# Patient Record
(demographics unavailable — no encounter records)

---

## 2024-10-22 NOTE — REASON FOR VISIT
[Patient with collateral] : Patient with collateral  [Father] : father [FreeTextEntry1] : med management

## 2024-10-22 NOTE — RISK ASSESSMENT
[No, patient denies ideation or behavior] : No, patient denies ideation or behavior [Clinical Interview] : Clinical Interview [Collateral Sources] : Collateral Sources [No] : No [None in the patient's lifetime] : None in the patient's lifetime [None Known] : none known

## 2024-10-22 NOTE — PHYSICAL EXAM
[Cooperative] : cooperative [Clear] : clear [[place]] : concrete [Hallucinations - Auditory] : hallucinations - auditory [Moderate] : moderate [Well groomed] : well groomed [Average] : average [Euthymic] : euthymic [None] : none [None Reported] : none reported [WNL] : within normal limits [de-identified] : Improved, more supple [de-identified] : paranoid delusions

## 2024-10-22 NOTE — HISTORY OF PRESENT ILLNESS
[FreeTextEntry1] : Chart reviewed, patient seen in person with pt's father.   At follow up today, patient appears far less oddly related and internally preoccupied than prior to his hospitalization with ECT treatments. He denies any lingering paranoia and states that the voices he hears have decreased. He states his mood is good, and that he feels good on his current medication regimen (has been receiving only Haldol 15 mg daily and benztropine; lithium, invega and klonopin were discontinued during hospitalization). Mentions that he would like to be on an HORN at some point instead of taking PO meds. States he is sleeping and eating well.   Father states that Michael is doing much better, he denies any acute safety concerns at this time.  [FreeTextEntry2] : Hospitalization at [facility] 8/3/20 to 9/16/20. ER records describe patient as "disorganized with constricted affect, behavior changes at home, increased aggressivity and internally preoccupied." He was stabilized on Risperdal and Klonopin. Pt then stopped taking meds, and was admitted 2/17/21-3/25/21 for decompensation. Pt was started on Zyprexa, with plan to transition to HORN, and dose was titrated up to 20mg. Dario developed elevated LFTs and neutropenia, therefore was cross titrated to Invega and lithium + klonopin were also started for agitation and mood. Pt showed minimal improvement. ECT was then considered, and Lithium and Klonopin were tapered off. Pt had 8 Bilateral ECT treatments with significant improvement in psychotic and mood symptoms. He received Invega 234mg on 5/19/21, 156mg on 5/24/21. He had no significant side effects from the injection. His next dose of 156mg is today and then one month from today

## 2024-10-22 NOTE — PHYSICAL EXAM
[Cooperative] : cooperative [Clear] : clear [[place]] : concrete [Hallucinations - Auditory] : hallucinations - auditory [Moderate] : moderate [Well groomed] : well groomed [Average] : average [Euthymic] : euthymic [None] : none [None Reported] : none reported [WNL] : within normal limits [de-identified] : Improved, more supple [de-identified] : paranoid delusions

## 2024-10-22 NOTE — PLAN
[Medication education provided] : Medication education provided. [Rationale for medication choices, possible risks/precautions, benefits, alternative treatment choices, and consequences of non-treatment discussed] : Rationale for medication choices, possible risks/precautions, benefits, alternative treatment choices, and consequences of non-treatment discussed with patient/family/caregiver  [FreeTextEntry4] : Goal 2: Michael will create meaningful personal, social, and academic engagements. Goal keyword or goal statement: Michael to work toward gaining employment.  Assessment of progress on goal/objective: Michael has enrolled in college and is a full time student. At present he doesn't want to work and would like to focus on his academics.

## 2024-10-22 NOTE — SOCIAL HISTORY
[FreeTextEntry1] : Living Situation: lives with family and Parents and 2 siblings.  Source of Income: unemployed.  Relationship Status: never .  no known substance use

## 2024-10-22 NOTE — HISTORY OF PRESENT ILLNESS
[FreeTextEntry1] : Chart reviewed, patient seen in person with pt's father.   At follow up today, patient appears far less oddly related and internally preoccupied than prior to his hospitalization with ECT treatments. He denies any lingering paranoia and states that the voices he hears have decreased. He states his mood is good, and that he feels good on his current medication regimen (has been receiving only Haldol 15 mg daily and benztropine; lithium, invega and klonopin were discontinued during hospitalization). Mentions that he would like to be on an HORN at some point instead of taking PO meds. States he is sleeping and eating well.   Father states that Michael is doing much better, he denies any acute safety concerns at this time.  [FreeTextEntry2] : Hospitalization at [facility] 8/3/20 to 9/16/20. ER records describe patient as "disorganized with constricted affect, behavior changes at home, increased aggressivity and internally preoccupied." He was stabilized on Risperdal and Klonopin. Pt then stopped taking meds, and was admitted 2/17/21-3/25/21 for decompensation. Pt was started on Zyprexa, with plan to transition to HORN, and dose was titrated up to 20mg. Dario developed elevated LFTs and neutropenia, therefore was cross titrated to Invega and lithium + klonopin were also started for agitation and mood. Pt showed minimal improvement. ECT was then considered, and Lithium and Klonopin were tapered off. Pt had 8 Bilateral ECT treatments with significant improvement in psychotic and mood symptoms. He received Invega 234mg on 5/19/21, 156mg on 5/24/21. He had no significant side effects from the injection. His next dose of 156mg is today and then one month from today

## 2024-10-23 NOTE — PLAN
[FreeTextEntry2] : Goal 1. The patient will recognize and improve daily symptoms of his schizoaffective disorder, bipolar type diagnosis as evidenced by an improvement in scores on mental health scales reviewed in therapy sessions every 1 year and get back to college to complete his degree.   Obj 1- The patient will attend bi weekly therapy sessions and will review symptoms and coping skills during sessions. The patient will practice 1 meaningful activity a day   Obj 2- The patient will take daily meds as prescribed and meet with psychiatrist as scheduled  [Cognitive and/or Behavior Therapy] : Cognitive and/or Behavior Therapy  [Interpersonal Therapy] : Interpersonal Therapy  [Motivational Interviewing] : Motivational Interviewing  [Psychodynamic Therapy] : Psychodynamic Therapy  [Psychoeducation] : Psychoeducation  [Skills training (all types)] : Skills training (all types)  [Supportive Therapy] : Supportive Therapy [de-identified] : The patient came for his session in person with his father. The patient was just recently discharged from the hospital where he went voluntarily, spent about a month and had ECT. During the session, the patient was logical, able to answer questions, was more focused, not sleepy, didn't giggle, and acted appropriately. The patient's father also stated that he sees a lot of positive changes after ECT, something that also worked for the patient last time he had it done. The patient goes to the gym with his brother and sister, spends time at home, plays computer games and is planning to go to college. The therapist and the patient discussed an option for the patient not to start college right away since he was just discharged, the patient and his father agreed. Overall the patient's state has improved.  The patient takes medications as prescribed. Throughout the session the patient was provided with active listening, motivational interviewing, emotional support and empathy.  [Recommended Frequency of Visits: ____] : Recommended frequency of visits: [unfilled] [Return in ____ week(s)] : Return in [unfilled] week(s) [FreeTextEntry1] : the therapist will meet the patient every 2-3 weeks

## 2024-10-23 NOTE — REASON FOR VISIT
[Other Location: e.g. Home (Enter Location, City,State)___] : The patient, [unfilled], was located at [unfilled] at the time of the visit. [FreeTextEntry4] : 10 am [FreeTextEntry5] : 10 45am [Patient] : Patient [Father] : father [FreeTextEntry1] : therapy f/u

## 2024-10-29 NOTE — DISCUSSION/SUMMARY
[Date of Last Annual Labs: _____] : Date of Last Annual Labs: [unfilled] [Tobacco Screening Completed?] : Tobacco Screening Completed: Yes [Date of Last HbgA1c: _____] : Date of Last HbgA1c: [unfilled] [Date of Last Lipid Profile: _____] : Date of Last Lipid Profile: [unfilled] [FreeTextEntry1] : Contacted physician via Teams on inpatient at [address] [place] [address] where patient had received ECT treatment in order to ask about plan for maintenance ECT now that he has been discharged

## 2024-10-29 NOTE — DISCUSSION/SUMMARY
[FreeTextEntry1] : Pt. no - showed to visit. Provider called, patient stated he forgot. Provider called father, set up appointment for November 5th at 9am

## 2024-10-29 NOTE — DISCUSSION/SUMMARY
[Date of Last Annual Labs: _____] : Date of Last Annual Labs: [unfilled] [Tobacco Screening Completed?] : Tobacco Screening Completed: Yes [Date of Last HbgA1c: _____] : Date of Last HbgA1c: [unfilled] [Date of Last Lipid Profile: _____] : Date of Last Lipid Profile: [unfilled] [FreeTextEntry1] : Contacted physician via Teams on inpatient at [address] [place] [address] where patient had received ECT treatment in order to ask about plan for maintenance ECT now that he has been discharged

## 2024-11-05 NOTE — HISTORY OF PRESENT ILLNESS
[FreeTextEntry1] : Chart reviewed, patient seen in person with pt's father and his sister.   At follow up today, patient appears linear, not paranoid, not internally preoccupied.  He denies any lingering paranoia and states that he no longer has any auditory hallucinations.  He states his mood is good, and that he feels good on his current medication regimen. Provider discussed with patient and his family benefit of maintenance ECT therapy, at this Michael is hesitant to start (states he is scared of the anesthesia and the trek), but father and sister are open to the idea. Family would like provider to speak with mother before restarting lithium. Planning to get HORN next week, in the meantime has been taking his PO meds without AE. States he is sleeping and eating well.   Father states that Michael is doing much better, he denies any acute safety concerns at this time.  [FreeTextEntry2] : Hospitalization at [facility] 8/3/20 to 9/16/20. ER records describe patient as "disorganized with constricted affect, behavior changes at home, increased aggressivity and internally preoccupied." He was stabilized on Risperdal and Klonopin. Pt then stopped taking meds, and was admitted 2/17/21-3/25/21 for decompensation. Pt was started on Zyprexa, with plan to transition to HORN, and dose was titrated up to 20mg. Dario developed elevated LFTs and neutropenia, therefore was cross titrated to Invega and lithium + klonopin were also started for agitation and mood. Pt showed minimal improvement. ECT was then considered, and Lithium and Klonopin were tapered off. Pt had 8 Bilateral ECT treatments with significant improvement in psychotic and mood symptoms. He received Invega 234mg on 5/19/21, 156mg on 5/24/21. He had no significant side effects from the injection. His next dose of 156mg is today and then one month from today

## 2024-11-05 NOTE — HISTORY OF PRESENT ILLNESS
[FreeTextEntry1] : Chart reviewed, patient seen in person with pt's father and his sister.   At follow up today, patient appears linear, not paranoid, not internally preoccupied.  He denies any lingering paranoia and states that he no longer has any auditory hallucinations.  He states his mood is good, and that he feels good on his current medication regimen. Provider discussed with patient and his family benefit of maintenance ECT therapy, at this Michael is hesitant to start (states he is scared of the anesthesia and the trek), but father and sister are open to the idea. Family would like provider to speak with mother before restarting lithium. Planning to get HORN next week, in the meantime has been taking his PO meds without AE. States he is sleeping and eating well.   Father states that Michael is doing much better, he denies any acute safety concerns at this time.  [FreeTextEntry2] : Hospitalization at [facility] 8/3/20 to 9/16/20. ER records describe patient as "disorganized with constricted affect, behavior changes at home, increased aggressivity and internally preoccupied." He was stabilized on Risperdal and Klonopin. Pt then stopped taking meds, and was admitted 2/17/21-3/25/21 for decompensation. Pt was started on Zyprexa, with plan to transition to HORN, and dose was titrated up to 20mg. Dario developed elevated LFTs and neutropenia, therefore was cross titrated to Invega and lithium + klonopin were also started for agitation and mood. Pt showed minimal improvement. ECT was then considered, and Lithium and Klonopin were tapered off. Pt had 8 Bilateral ECT treatments with significant improvement in psychotic and mood symptoms. He received Invega 234mg on 5/19/21, 156mg on 5/24/21. He had no significant side effects from the injection. His next dose of 156mg is today and then one month from today

## 2024-11-05 NOTE — PHYSICAL EXAM
[Well groomed] : well groomed [Cooperative] : cooperative [Euthymic] : euthymic [Clear] : clear [[place]] : concrete [None] : none [None Reported] : none reported [Hallucinations - Auditory] : hallucinations - auditory [Average] : average [WNL] : within normal limits [Moderate] : moderate [de-identified] : Improved, more supple [de-identified] : paranoid delusions [0] : 0 [No] : No

## 2024-11-05 NOTE — PHYSICAL EXAM
[Well groomed] : well groomed [Cooperative] : cooperative [Euthymic] : euthymic [Clear] : clear [[place]] : concrete [None] : none [None Reported] : none reported [Hallucinations - Auditory] : hallucinations - auditory [Average] : average [WNL] : within normal limits [Moderate] : moderate [de-identified] : Improved, more supple [de-identified] : paranoid delusions [0] : 0 [No] : No

## 2024-11-06 NOTE — PLAN
[FreeTextEntry2] : Goal 1. The patient will recognize and improve daily symptoms of his schizoaffective disorder, bipolar type diagnosis as evidenced by an improvement in scores on mental health scales reviewed in therapy sessions every 1 year and get back to college to complete his degree.   Obj 1- The patient will attend bi weekly therapy sessions and will review symptoms and coping skills during sessions. The patient will practice 1 meaningful activity a day   Obj 2- The patient will take daily meds as prescribed and meet with psychiatrist as scheduled  [Cognitive and/or Behavior Therapy] : Cognitive and/or Behavior Therapy  [Interpersonal Therapy] : Interpersonal Therapy  [Motivational Interviewing] : Motivational Interviewing  [Psychodynamic Therapy] : Psychodynamic Therapy  [Psychoeducation] : Psychoeducation  [Skills training (all types)] : Skills training (all types)  [Supportive Therapy] : Supportive Therapy [de-identified] : The patient came for his therapy session with his father. The patient and his father reported that the patient does great after the ECT and procedure. Also, previously the patient refused to have ECT maintenance and now he is open to see what his options for the maintenance are. In addition, the patient was able to get a job as a  in [place] at the [employer]. The patient was thinking of taking lots of shifts but was encouraged by the therapist to take it easy and give himself enough time to rest. The patient reports good mood, no issues or concerns. Overall, the patient is doing much better and was praised for always being on time and finding a job. The patient's father sees a big improvement after the ECT. The patient takes medications as prescribed. Throughout the session the patient was provided with active listening, motivational interviewing, emotional support and empathy.  [Recommended Frequency of Visits: ____] : Recommended frequency of visits: [unfilled] [Return in ____ week(s)] : Return in [unfilled] week(s) [FreeTextEntry1] : the therapist will meet the patient every 2-3 weeks

## 2024-11-06 NOTE — PLAN
[FreeTextEntry2] : Goal 1. The patient will recognize and improve daily symptoms of his schizoaffective disorder, bipolar type diagnosis as evidenced by an improvement in scores on mental health scales reviewed in therapy sessions every 1 year and get back to college to complete his degree.   Obj 1- The patient will attend bi weekly therapy sessions and will review symptoms and coping skills during sessions. The patient will practice 1 meaningful activity a day   Obj 2- The patient will take daily meds as prescribed and meet with psychiatrist as scheduled  [Cognitive and/or Behavior Therapy] : Cognitive and/or Behavior Therapy  [Interpersonal Therapy] : Interpersonal Therapy  [Motivational Interviewing] : Motivational Interviewing  [Psychodynamic Therapy] : Psychodynamic Therapy  [Psychoeducation] : Psychoeducation  [Skills training (all types)] : Skills training (all types)  [Supportive Therapy] : Supportive Therapy [de-identified] : The patient came for his therapy session with his father. The patient and his father reported that the patient does great after the ECT and procedure. Also, previously the patient refused to have ECT maintenance and now he is open to see what his options for the maintenance are. In addition, the patient was able to get a job as a  in [place] at the [employer]. The patient was thinking of taking lots of shifts but was encouraged by the therapist to take it easy and give himself enough time to rest. The patient reports good mood, no issues or concerns. Overall, the patient is doing much better and was praised for always being on time and finding a job. The patient's father sees a big improvement after the ECT. The patient takes medications as prescribed. Throughout the session the patient was provided with active listening, motivational interviewing, emotional support and empathy.  [Recommended Frequency of Visits: ____] : Recommended frequency of visits: [unfilled] [Return in ____ week(s)] : Return in [unfilled] week(s) [FreeTextEntry1] : the therapist will meet the patient every 2-3 weeks

## 2024-11-08 NOTE — DISCUSSION/SUMMARY
[Date of Last Annual Labs: _____] : Date of Last Annual Labs: [unfilled] [Tobacco Screening Completed?] : Tobacco Screening Completed: Yes [Date of Last HbgA1c: _____] : Date of Last HbgA1c: [unfilled] [Date of Last Lipid Profile: _____] : Date of Last Lipid Profile: [unfilled] [FreeTextEntry1] : Provider called pt's mother and left VM, as father has mentioned that mother wanted to talk about pt's mental health. Writer requested call back if mother wanted to discuss further.

## 2024-11-13 NOTE — TREATMENT
[No Side Effects] : No side effects [FreeTextEntry4] : Michael was seen today for Haldol Decanoate 150 mg IM in the right deltoid muscle. Area cleansed prior to and without signs or symptoms of adverse reaction after administration. Medication education reviewed with Michael to stop his oral haldol today he verbalized understanding as did Cristiano his Dad. Michael was cooperative and friendly. Speaking about his job and enjoying his "paycheck". Micheal reports his mood is good. His mood euthymic and bright affect eye contact and adl's good. Thoughts relevant to conversation without dangerous ideations. He will return to this clinic on 12/10 to meet with Dr. Flores and this writer for injection.

## 2024-12-10 NOTE — TREATMENT
[No Side Effects] : No side effects [FreeTextEntry4] : Michael was seen today for Haldol Decanoate 150mg IM in the left deltoid muscle. Area cleansed prior to and without signs or symptoms of adverse reaction after administration. Medication education reviewed with Michael he denies any side effects or concerns at this time. Michael was pleasant and cooperative with appointment he also had an appointment with Dr Flores please refer to that note for a mental status. He will return to this clinic on 1/7/2025 to meet with Dr Flores and this writer for injection.

## 2024-12-10 NOTE — DISCUSSION/SUMMARY
[FreeTextEntry1] : Michael is a 20 yo male, never , no children, domiciled with family (mother, father, 27yo brother, 22yo sister), in private residence, 3rd year of [employer] studying Accounting with a focus on  (also taking online courses at [employer]), previously self-employed part-time design, 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 PMH, PPH of schizoaffective bipolar type on invega sustenna HORN, multiple IPP admissions (first IPP admission 8/3/20-9/16/20 or new onset psychosis, second IPP in 2/17/21-3/25/21 for non-compliance with medications, third IPP at [facility] 03/28/24-04/04/24 for worsening psychosis and aggression , 4th IPP 4/3/24-4/18/24 for worsening psychosis/diana at [place], [place]), hx of medication trials on lithium and klonopin, hx of 8 bilateral ECT treatments with significant improvement in psychotic and mood symptoms, no pSA/NSSIB, no SI/HI, no substance use, no reported legal hx, who was previously a pt of Dr. Garcia through FEP (first episode psychosis) program, now a patient at [facility]  Per chart review from FEP, "Dario presented initially with changes in behavior since July 2020 leading to disorganized thought process/behavior and internal preoccupation with physically aggressive behavior resulting in first hospitalization at [facility] 8/3/20-9/16/20 consistent with diagnosis of Schizophrenia. He was on Risperdal and Klonopin, but stopped taking meds around late 2021 and early 2021 which led to his second hospitalization 2/17/21-3/25/21 for disorganized behavior, AH, and paranoia in the context of medication non-adherence. As per discharge summary, patient during hospitalization had several episodes of agitation, aggression due to his psychosis. Pt was started on Zyprexa, with plan to transition to HORN, and dose was titrated up to 20mg. Dario developed elevated LFTs and neutropenia, therefore was cross titrated to Invega and lithium + klonopin were also started for agitation and mood. Pt showed minimal improvement. ECT was then considered, and Lithium and Klonopin were tapered off. Pt had 8 Bilateral ECT treatments with significant improvement in psychotic and mood symptoms. He had no significant side effects from the injection. Michael was again hospitalized in September at [facility] for ECT, lithium and Klonopin were tapered off, and he received 8 ECT treatments.    At follow up today, patient continues to appear far less oddly related and internally preoccupied than prior to his hospitalization with ECT treatments. He denies any lingering paranoia and states that the voices he hears have decreased. He states his mood is good, and that he feels good on his current medication regimen. Discussed maintenance ECT which patient and family are now amendable to. Patient has no acute decompensation of his psychiatric symptoms at this time, family has no acute safety concerns. Remains appropriate for outpatient level of care  Impression: schizoaffective disorder, bipolar type, with poor prognostic features  Plan: - Next HORN of Haldol scheduled for 01/07 - Pt now amendable to maintenance ECT, provider reached out to ECT physician at [facility] where patient had his last sessions - Consider lithium for mood symptoms, will discuss with mother over the phone - Pt sp Invega HORN 234 mg, lithium (1350) and Klonopin, may consider restarting if psychotic symptoms worsen - in future, may consider initiation of clozapine to replace the PO Haldol, however pt previously neutropenic on zyprexa and had elevated liver enzymes - Follow up with patient in 4 weeks  [Date of Last AIMS: _____] : Date of Last AIMS: [unfilled] [Date of Last HbgA1c: _____] : Date of Last HbgA1c: [unfilled] [Date of Last Lipid Profile: _____] : Date of Last Lipid Profile: [unfilled]

## 2024-12-10 NOTE — HISTORY OF PRESENT ILLNESS
[FreeTextEntry1] : Chart reviewed, patient seen in person with pt's father.   At follow up today, patient appears linear, not paranoid, not internally preoccupied. He has a supple affect. He denies any lingering paranoia and states that he no longer has any auditory hallucinations.  He states his mood is good, and that he feels good on his current medication regimen. He and his family are agreeable to starting maintenance ECT at this time at [facility]. States he is sleeping and eating well. Denies any AE of Haldol including rigidity, spasticity, akathisia or involuntary movements.  Father states that Michael is doing much better, he denies any acute safety concerns at this time.  [FreeTextEntry2] : Hospitalization at [facility] 8/3/20 to 9/16/20. ER records describe patient as "disorganized with constricted affect, behavior changes at home, increased aggressivity and internally preoccupied." He was stabilized on Risperdal and Klonopin. Pt then stopped taking meds, and was admitted 2/17/21-3/25/21 for decompensation. Pt was started on Zyprexa, with plan to transition to HORN, and dose was titrated up to 20mg. Dario developed elevated LFTs and neutropenia, therefore was cross titrated to Invega and lithium + klonopin were also started for agitation and mood. Pt showed minimal improvement. ECT was then considered, and Lithium and Klonopin were tapered off. Pt had 8 Bilateral ECT treatments with significant improvement in psychotic and mood symptoms. He received Invega 234mg on 5/19/21, 156mg on 5/24/21. He had no significant side effects from the injection. His next dose of 156mg is today and then one month from today negative

## 2024-12-10 NOTE — PLAN
[FreeTextEntry4] : Goal 2: Michael will create meaningful personal, social, and academic engagements. Goal keyword or goal statement: Michael to work toward gaining employment.  Assessment of progress on goal/objective: Michael has enrolled in college and is a full time student. At present he doesn't want to work and would like to focus on his academics.

## 2024-12-10 NOTE — HISTORY OF PRESENT ILLNESS
[FreeTextEntry1] : Chart reviewed, patient seen in person with pt's father.   At follow up today, patient appears linear, not paranoid, not internally preoccupied. He has a supple affect. He denies any lingering paranoia and states that he no longer has any auditory hallucinations.  He states his mood is good, and that he feels good on his current medication regimen. He and his family are agreeable to starting maintenance ECT at this time at [facility]. States he is sleeping and eating well. Denies any AE of Haldol including rigidity, spasticity, akathisia or involuntary movements.  Father states that Michael is doing much better, he denies any acute safety concerns at this time.  [FreeTextEntry2] : Hospitalization at [facility] 8/3/20 to 9/16/20. ER records describe patient as "disorganized with constricted affect, behavior changes at home, increased aggressivity and internally preoccupied." He was stabilized on Risperdal and Klonopin. Pt then stopped taking meds, and was admitted 2/17/21-3/25/21 for decompensation. Pt was started on Zyprexa, with plan to transition to HORN, and dose was titrated up to 20mg. Dario developed elevated LFTs and neutropenia, therefore was cross titrated to Invega and lithium + klonopin were also started for agitation and mood. Pt showed minimal improvement. ECT was then considered, and Lithium and Klonopin were tapered off. Pt had 8 Bilateral ECT treatments with significant improvement in psychotic and mood symptoms. He received Invega 234mg on 5/19/21, 156mg on 5/24/21. He had no significant side effects from the injection. His next dose of 156mg is today and then one month from today

## 2024-12-10 NOTE — DISCUSSION/SUMMARY
[FreeTextEntry1] : Michael is a 22 yo male, never , no children, domiciled with family (mother, father, 27yo brother, 22yo sister), in private residence, 3rd year of [employer] studying Accounting with a focus on  (also taking online courses at [employer] [employer]), previously self-employed part-time design, 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 PMH, PPH of schizoaffective bipolar type on invega sustenna HORN, multiple IPP admissions (first IPP admission 8/3/20-9/16/20 or new onset psychosis, second IPP in 2/17/21-3/25/21 for non-compliance with medications, third IPP at [facility] 03/28/24-04/04/24 for worsening psychosis and aggression , 4th IPP 4/3/24-4/18/24 for worsening psychosis/diana at [place], [place]), hx of medication trials on lithium and klonopin, hx of 8 bilateral ECT treatments with significant improvement in psychotic and mood symptoms, no pSA/NSSIB, no SI/HI, no substance use, no reported legal hx, who was previously a pt of Dr. Garcia through FEP (first episode psychosis) program, now a patient at [facility]  Per chart review from FEP, "Dario presented initially with changes in behavior since July 2020 leading to disorganized thought process/behavior and internal preoccupation with physically aggressive behavior resulting in first hospitalization at [facility] 8/3/20-9/16/20 consistent with diagnosis of Schizophrenia. He was on Risperdal and Klonopin, but stopped taking meds around late 2021 and early 2021 which led to his second hospitalization 2/17/21-3/25/21 for disorganized behavior, AH, and paranoia in the context of medication non-adherence. As per discharge summary, patient during hospitalization had several episodes of agitation, aggression due to his psychosis. Pt was started on Zyprexa, with plan to transition to HORN, and dose was titrated up to 20mg. Dario developed elevated LFTs and neutropenia, therefore was cross titrated to Invega and lithium + klonopin were also started for agitation and mood. Pt showed minimal improvement. ECT was then considered, and Lithium and Klonopin were tapered off. Pt had 8 Bilateral ECT treatments with significant improvement in psychotic and mood symptoms. He had no significant side effects from the injection. Michael was again hospitalized in September at [facility] for ECT, lithium and Klonopin were tapered off, and he received 8 ECT treatments.    At follow up today, patient continues to appear far less oddly related and internally preoccupied than prior to his hospitalization with ECT treatments. He denies any lingering paranoia and states that the voices he hears have decreased. He states his mood is good, and that he feels good on his current medication regimen. Discussed maintenance ECT which patient and family are now amendable to. Patient has no acute decompensation of his psychiatric symptoms at this time, family has no acute safety concerns. Remains appropriate for outpatient level of care  Impression: schizoaffective disorder, bipolar type, with poor prognostic features  Plan: - Next HORN of Haldol scheduled for 01/07 - Pt now amendable to maintenance ECT, provider reached out to ECT physician at [facility] where patient had his last sessions - Consider lithium for mood symptoms, will discuss with mother over the phone - Pt sp Invega HORN 234 mg, lithium (1350) and Klonopin, may consider restarting if psychotic symptoms worsen - in future, may consider initiation of clozapine to replace the PO Haldol, however pt previously neutropenic on zyprexa and had elevated liver enzymes - Follow up with patient in 4 weeks  [Date of Last AIMS: _____] : Date of Last AIMS: [unfilled] [Date of Last HbgA1c: _____] : Date of Last HbgA1c: [unfilled] [Date of Last Lipid Profile: _____] : Date of Last Lipid Profile: [unfilled]

## 2024-12-19 NOTE — DISCUSSION/SUMMARY
[Date of Last Annual Labs: _____] : Date of Last Annual Labs: [unfilled] [Tobacco Screening Completed?] : Tobacco Screening Completed: Yes [Date of Last AIMS: _____] : Date of Last AIMS: [unfilled] [Date of Last HbgA1c: _____] : Date of Last HbgA1c: [unfilled] [Date of Last Lipid Profile: _____] : Date of Last Lipid Profile: [unfilled] [FreeTextEntry1] : Michael is a 22 yo male, never , no children, domiciled with family (mother, father, 27yo brother, 22yo sister), in private residence, 3rd year of [employer] studying Accounting with a focus on  (also taking online courses at [employer]), previously self-employed part-time design, 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 PMH, PPH of schizoaffective bipolar type on invega sustenna HORN, multiple IPP admissions (first IPP admission 8/3/20-9/16/20 or new onset psychosis, second IPP in 2/17/21-3/25/21 for non-compliance with medications, third IPP at [facility] 03/28/24-04/04/24 for worsening psychosis and aggression , 4th IPP 4/3/24-4/18/24 for worsening psychosis/diana at [place], [place]), hx of medication trials on lithium and klonopin, hx of 8 bilateral ECT treatments with significant improvement in psychotic and mood symptoms, no pSA/NSSIB, no SI/HI, no substance use, no reported legal hx, who was previously a pt of Dr. Garcia through FEP (first episode psychosis) program, now a patient at [facility]  Per chart review from FEP, "Dario presented initially with changes in behavior since July 2020 leading to disorganized thought process/behavior and internal preoccupation with physically aggressive behavior resulting in first hospitalization at [facility] 8/3/20-9/16/20 consistent with diagnosis of Schizophrenia. He was on Risperdal and Klonopin, but stopped taking meds around late 2021 and early 2021 which led to his second hospitalization 2/17/21-3/25/21 for disorganized behavior, AH, and paranoia in the context of medication non-adherence. As per discharge summary, patient during hospitalization had several episodes of agitation, aggression due to his psychosis. Pt was started on Zyprexa, with plan to transition to HORN, and dose was titrated up to 20mg. Dario developed elevated LFTs and neutropenia, therefore was cross titrated to Invega and lithium + klonopin were also started for agitation and mood. Pt showed minimal improvement. ECT was then considered, and Lithium and Klonopin were tapered off. Pt had 8 Bilateral ECT treatments with significant improvement in psychotic and mood symptoms. He had no significant side effects from the injection. Michael was again hospitalized in September at [facility] for ECT, lithium and Klonopin were tapered off, and he received 8 ECT treatments.    At follow up today, patient appears far less oddly related and internally preoccupied than prior to his hospitalization with ECT treatments. He denies any lingering paranoia and states that the voices he hears have decreased. He states his mood is good, and that he feels good on his current medication regimen. Discussed maintenance ECT (at this time Michael does not want any) as well as lithium (sister and father would like physician to speak with mother). Patient has no acute decompensation of his psychiatric symptoms at this time, family has no acute safety concerns. Remains appropriate for outpatient level of care  Impression: schizoaffective disorder, bipolar type, with poor prognostic features  Plan: - Continue Haldol 5 mg in AM, 10 mg in PM, plan to discontinue PO meds after next HORN - Next HORN of Haldol scheduled for 11/13 - Consider lithium for mood symptoms, will discuss with mother over the phone - Continue discussing RAB of maintenance ECT - Pt sp Invega HORN 234 mg, lithium (1350) and Klonopin, may consider restarting if psychotic symptoms worsen - in future, may consider initiation of clozapine to replace the PO Haldol, however pt previously neutropenic on zyprexa and had elevated liver enzymes - Follow up with patient in 4 weeks

## 2024-12-19 NOTE — DISCUSSION/SUMMARY
[Date of Last Annual Labs: _____] : Date of Last Annual Labs: [unfilled] [Tobacco Screening Completed?] : Tobacco Screening Completed: Yes [Date of Last AIMS: _____] : Date of Last AIMS: [unfilled] [Date of Last HbgA1c: _____] : Date of Last HbgA1c: [unfilled] [Date of Last Lipid Profile: _____] : Date of Last Lipid Profile: [unfilled] [FreeTextEntry1] : Michael is a 22 yo male, never , no children, domiciled with family (mother, father, 27yo brother, 20yo sister), in private residence, 3rd year of [employer] studying Accounting with a focus on  (also taking online courses at [employer]), previously self-employed part-time design, 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 PMH, PPH of schizoaffective bipolar type on invega sustenna HORN, multiple IPP admissions (first IPP admission 8/3/20-9/16/20 or new onset psychosis, second IPP in 2/17/21-3/25/21 for non-compliance with medications, third IPP at [facility] 03/28/24-04/04/24 for worsening psychosis and aggression , 4th IPP 4/3/24-4/18/24 for worsening psychosis/diana at [place], [place]), hx of medication trials on lithium and klonopin, hx of 8 bilateral ECT treatments with significant improvement in psychotic and mood symptoms, no pSA/NSSIB, no SI/HI, no substance use, no reported legal hx, who was previously a pt of Dr. Garcai through FEP (first episode psychosis) program, now a patient at [facility]  Per chart review from FEP, "Dario presented initially with changes in behavior since July 2020 leading to disorganized thought process/behavior and internal preoccupation with physically aggressive behavior resulting in first hospitalization at [facility] 8/3/20-9/16/20 consistent with diagnosis of Schizophrenia. He was on Risperdal and Klonopin, but stopped taking meds around late 2021 and early 2021 which led to his second hospitalization 2/17/21-3/25/21 for disorganized behavior, AH, and paranoia in the context of medication non-adherence. As per discharge summary, patient during hospitalization had several episodes of agitation, aggression due to his psychosis. Pt was started on Zyprexa, with plan to transition to HORN, and dose was titrated up to 20mg. Dario developed elevated LFTs and neutropenia, therefore was cross titrated to Invega and lithium + klonopin were also started for agitation and mood. Pt showed minimal improvement. ECT was then considered, and Lithium and Klonopin were tapered off. Pt had 8 Bilateral ECT treatments with significant improvement in psychotic and mood symptoms. He had no significant side effects from the injection. Michael was again hospitalized in September at [facility] for ECT, lithium and Klonopin were tapered off, and he received 8 ECT treatments.    At follow up today, patient appears far less oddly related and internally preoccupied than prior to his hospitalization with ECT treatments. He denies any lingering paranoia and states that the voices he hears have decreased. He states his mood is good, and that he feels good on his current medication regimen. Discussed maintenance ECT (at this time Michael does not want any) as well as lithium (sister and father would like physician to speak with mother). Patient has no acute decompensation of his psychiatric symptoms at this time, family has no acute safety concerns. Remains appropriate for outpatient level of care  Impression: schizoaffective disorder, bipolar type, with poor prognostic features  Plan: - Continue Haldol 5 mg in AM, 10 mg in PM, plan to discontinue PO meds after next HORN - Next HORN of Haldol scheduled for 11/13 - Consider lithium for mood symptoms, will discuss with mother over the phone - Continue discussing RAB of maintenance ECT - Pt sp Invega HORN 234 mg, lithium (1350) and Klonopin, may consider restarting if psychotic symptoms worsen - in future, may consider initiation of clozapine to replace the PO Haldol, however pt previously neutropenic on zyprexa and had elevated liver enzymes - Follow up with patient in 4 weeks

## 2024-12-19 NOTE — HISTORY OF PRESENT ILLNESS
[FreeTextEntry1] : Chart reviewed, patient seen in person with pt's father and his sister.   At follow up today, patient appears linear, not paranoid, not internally preoccupied.  He denies any lingering paranoia and states that he no longer has any auditory hallucinations.  He states his mood is good, and that he feels good on his current medication regimen. Provider discussed with patient and his family benefit of maintenance ECT therapy, at this Michael is hesitant to start (states he is scared of the anesthesia and the trek), but father and sister are open to the idea. Family would like provider to speak with mother before restarting lithium. Planning to get HORN next week, in the meantime has been taking his PO meds without AE. States he is sleeping and eating well.   Father states that Michael is doing much better, he denies any acute safety concerns at this time.  [FreeTextEntry2] : Hospitalization at [facility] 8/3/20 to 9/16/20. ER records describe patient as "disorganized with constricted affect, behavior changes at home, increased aggressivity and internally preoccupied." He was stabilized on Risperdal and Klonopin. Pt then stopped taking meds, and was admitted 2/17/21-3/25/21 for decompensation. Pt was started on Zyprexa, with plan to transition to HORN, and dose was titrated up to 20mg. Dario developed elevated LFTs and neutropenia, therefore was cross titrated to Invega and lithium + klonopin were also started for agitation and mood. Pt showed minimal improvement. ECT was then considered, and Lithium and Klonopin were tapered off. Pt had 8 Bilateral ECT treatments with significant improvement in psychotic and mood symptoms. He received Invega 234mg on 5/19/21, 156mg on 5/24/21. He had no significant side effects from the injection. His next dose of 156mg is today and then one month from today

## 2024-12-19 NOTE — DISCUSSION/SUMMARY
[Date of Last Annual Labs: _____] : Date of Last Annual Labs: [unfilled] [Tobacco Screening Completed?] : Tobacco Screening Completed: Yes [Date of Last AIMS: _____] : Date of Last AIMS: [unfilled] [Date of Last HbgA1c: _____] : Date of Last HbgA1c: [unfilled] [Date of Last Lipid Profile: _____] : Date of Last Lipid Profile: [unfilled] [FreeTextEntry1] : Michael is a 20 yo male, never , no children, domiciled with family (mother, father, 25yo brother, 22yo sister), in private residence, 3rd year of [employer] studying Accounting with a focus on  (also taking online courses at [employer]), previously self-employed part-time design, 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 PMH, PPH of schizoaffective bipolar type on invega sustenna HORN, multiple IPP admissions (first IPP admission 8/3/20-9/16/20 or new onset psychosis, second IPP in 2/17/21-3/25/21 for non-compliance with medications, third IPP at [facility] 03/28/24-04/04/24 for worsening psychosis and aggression , 4th IPP 4/3/24-4/18/24 for worsening psychosis/diana at [place], [place]), hx of medication trials on lithium and klonopin, hx of 8 bilateral ECT treatments with significant improvement in psychotic and mood symptoms, no pSA/NSSIB, no SI/HI, no substance use, no reported legal hx, who was previously a pt of Dr. Garcia through FEP (first episode psychosis) program, now a patient at [facility]  Per chart review from FEP, "Dario presented initially with changes in behavior since July 2020 leading to disorganized thought process/behavior and internal preoccupation with physically aggressive behavior resulting in first hospitalization at [facility] 8/3/20-9/16/20 consistent with diagnosis of Schizophrenia. He was on Risperdal and Klonopin, but stopped taking meds around late 2021 and early 2021 which led to his second hospitalization 2/17/21-3/25/21 for disorganized behavior, AH, and paranoia in the context of medication non-adherence. As per discharge summary, patient during hospitalization had several episodes of agitation, aggression due to his psychosis. Pt was started on Zyprexa, with plan to transition to HORN, and dose was titrated up to 20mg. Dario developed elevated LFTs and neutropenia, therefore was cross titrated to Invega and lithium + klonopin were also started for agitation and mood. Pt showed minimal improvement. ECT was then considered, and Lithium and Klonopin were tapered off. Pt had 8 Bilateral ECT treatments with significant improvement in psychotic and mood symptoms. He had no significant side effects from the injection. Michael was again hospitalized in September at [facility] for ECT, lithium and Klonopin were tapered off, and he received 8 ECT treatments.    At follow up today, patient appears far less oddly related and internally preoccupied than prior to his hospitalization with ECT treatments. He denies any lingering paranoia and states that the voices he hears have decreased. He states his mood is good, and that he feels good on his current medication regimen. Discussed maintenance ECT (at this time Michael does not want any) as well as lithium (sister and father would like physician to speak with mother). Patient has no acute decompensation of his psychiatric symptoms at this time, family has no acute safety concerns. Remains appropriate for outpatient level of care  Impression: schizoaffective disorder, bipolar type, with poor prognostic features  Plan: - Continue Haldol 5 mg in AM, 10 mg in PM, plan to discontinue PO meds after next HORN - Next HORN of Haldol scheduled for 11/13 - Consider lithium for mood symptoms, will discuss with mother over the phone - Continue discussing RAB of maintenance ECT - Pt sp Invega HORN 234 mg, lithium (1350) and Klonopin, may consider restarting if psychotic symptoms worsen - in future, may consider initiation of clozapine to replace the PO Haldol, however pt previously neutropenic on zyprexa and had elevated liver enzymes - Follow up with patient in 4 weeks

## 2024-12-19 NOTE — PHYSICAL EXAM
[Well groomed] : well groomed [Cooperative] : cooperative [Euthymic] : euthymic [Clear] : clear [[place]] : concrete [None] : none [None Reported] : none reported [Hallucinations - Auditory] : hallucinations - auditory [Average] : average [WNL] : within normal limits [Moderate] : moderate [0] : 0 [No] : No [de-identified] : Improved, more supple [de-identified] : paranoid delusions

## 2024-12-19 NOTE — HISTORY OF PRESENT ILLNESS
[FreeTextEntry1] : Chart reviewed, patient seen in person with pt's father and his sister.   At follow up today, patient appears linear, not paranoid, not internally preoccupied.  He denies any lingering paranoia and states that he no longer has any auditory hallucinations.  He states his mood is good, and that he feels good on his current medication regimen. Provider discussed with patient and his family benefit of maintenance ECT therapy, at this Michael is hesitant to start (states he is scared of the anesthesia and the trek), but father and sister are open to the idea. Family would like provider to speak with mother before restarting lithium. Planning to get HORN next week, in the meantime has been taking his PO meds without AE. States he is sleeping and eating well.   Father states that Michael is doing much better, he denies any acute safety concerns at this time.  [FreeTextEntry2] : Hospitalization at [facility] 8/3/20 to 9/16/20. ER records describe patient as "disorganized with constricted affect, behavior changes at home, increased aggressivity and internally preoccupied." He was stabilized on Risperdal and Klonopin. Pt then stopped taking meds, and was admitted 2/17/21-3/25/21 for decompensation. Pt was started on Zyprexa, with plan to transition to HORN, and dose was titrated up to 20mg. Dario developed elevated LFTs and neutropenia, therefore was cross titrated to Invega and lithium + klonopin were also started for agitation and mood. Pt showed minimal improvement. ECT was then considered, and Lithium and Klonopin were tapered off. Pt had 8 Bilateral ECT treatments with significant improvement in psychotic and mood symptoms. He received Invega 234mg on 5/19/21, 156mg on 5/24/21. He had no significant side effects from the injection. His next dose of 156mg is today and then one month from today

## 2024-12-19 NOTE — HISTORY OF PRESENT ILLNESS
[FreeTextEntry1] : Chart reviewed, patient seen in person with pt's father and his sister.   At follow up today, patient appears linear, not paranoid, not internally preoccupied.  He denies any lingering paranoia and states that he no longer has any auditory hallucinations.  He states his mood is good, and that he feels good on his current medication regimen. Provider discussed with patient and his family benefit of maintenance ECT therapy, at this Michael is hesitant to start (states he is scared of the anesthesia and the trek), but father and sister are open to the idea. Family would like provider to speak with mother before restarting lithium. Planning to get HORN next week, in the meantime has been taking his PO meds without AE. States he is sleeping and eating well.   Father states that Michael is doing much better, he denies any acute safety concerns at this time.  [FreeTextEntry2] : Hospitalization at [facility] 8/3/20 to 9/16/20. ER records describe patient as "disorganized with constricted affect, behavior changes at home, increased aggressivity and internally preoccupied." He was stabilized on Risperdal and Klonopin. Pt then stopped taking meds, and was admitted 2/17/21-3/25/21 for decompensation. Pt was started on Zyprexa, with plan to transition to HORN, and dose was titrated up to 20mg. Dario developed elevated LFTs and neutropenia, therefore was cross titrated to Invega and lithium + klonopin were also started for agitation and mood. Pt showed minimal improvement. ECT was then considered, and Lithium and Klonopin were tapered off. Pt had 8 Bilateral ECT treatments with significant improvement in psychotic and mood symptoms. He received Invega 234mg on 5/19/21, 156mg on 5/24/21. He had no significant side effects from the injection. His next dose of 156mg is today and then one month from today

## 2024-12-19 NOTE — PHYSICAL EXAM
[Well groomed] : well groomed [Cooperative] : cooperative [Euthymic] : euthymic [Clear] : clear [[place]] : concrete [None] : none [None Reported] : none reported [Hallucinations - Auditory] : hallucinations - auditory [Average] : average [WNL] : within normal limits [Moderate] : moderate [0] : 0 [No] : No [de-identified] : Improved, more supple [de-identified] : paranoid delusions

## 2024-12-19 NOTE — PHYSICAL EXAM
[Well groomed] : well groomed [Cooperative] : cooperative [Euthymic] : euthymic [Clear] : clear [[place]] : concrete [None] : none [None Reported] : none reported [Hallucinations - Auditory] : hallucinations - auditory [Average] : average [WNL] : within normal limits [Moderate] : moderate [0] : 0 [No] : No [de-identified] : Improved, more supple [de-identified] : paranoid delusions

## 2024-12-31 NOTE — PLAN
[FreeTextEntry2] : Goal 1. The patient will recognize and improve daily symptoms of his schizoaffective disorder, bipolar type diagnosis as evidenced by an improvement in scores on mental health scales reviewed in therapy sessions every 1 year and get back to college to complete his degree.   Obj 1- The patient will attend bi weekly therapy sessions and will review symptoms and coping skills during sessions. The patient will practice 1 meaningful activity a day   Obj 2- The patient will take daily meds as prescribed and meet with psychiatrist as scheduled  [Cognitive and/or Behavior Therapy] : Cognitive and/or Behavior Therapy  [Interpersonal Therapy] : Interpersonal Therapy  [Motivational Interviewing] : Motivational Interviewing  [Psychodynamic Therapy] : Psychodynamic Therapy  [Psychoeducation] : Psychoeducation  [Skills training (all types)] : Skills training (all types)  [Supportive Therapy] : Supportive Therapy [de-identified] : The patient came in person for the session with his father, the patient permitted his father to be present during the session. The patient reported that he sleeps a lot because he is tired, sometimes 15 hrs, " i sleep because i enjoy sleeping". At this time the patient does work because his job hasn't called him. Also, the patietn stated that he takes an online course for software engineering, goes to the gym and sleeps. In addition, the patient's father reported that from time to time the patient eats to the point that he vomits. It seems as if the patient has to finish whatever he has on his plate and he eats really fast. The situation was discussed, and the therapist encouraged the patient to eat from the small plate so that he can't fill the plate to the point that it would be too much for the patient. The patient also stated that sometimes he is unable to fall asleep, sleep hygiene discussed, and the patient was provided with a printout for sleep hygiene tips and tricks. At this time the patient is not planning to go back to college. During the session, at times the patient was inappropriately laughing. The patient takes medications as prescribed. Throughout the session the patient was provided with active listening, motivational interviewing, emotional support and empathy.  [Recommended Frequency of Visits: ____] : Recommended frequency of visits: [unfilled] [Return in ____ week(s)] : Return in [unfilled] week(s) [FreeTextEntry1] : the therapist will meet the patient every 2-3 weeks

## 2024-12-31 NOTE — PLAN
[FreeTextEntry2] : Goal 1. The patient will recognize and improve daily symptoms of his schizoaffective disorder, bipolar type diagnosis as evidenced by an improvement in scores on mental health scales reviewed in therapy sessions every 1 year and get back to college to complete his degree.   Obj 1- The patient will attend bi weekly therapy sessions and will review symptoms and coping skills during sessions. The patient will practice 1 meaningful activity a day   Obj 2- The patient will take daily meds as prescribed and meet with psychiatrist as scheduled  [Cognitive and/or Behavior Therapy] : Cognitive and/or Behavior Therapy  [Interpersonal Therapy] : Interpersonal Therapy  [Motivational Interviewing] : Motivational Interviewing  [Psychodynamic Therapy] : Psychodynamic Therapy  [Psychoeducation] : Psychoeducation  [Skills training (all types)] : Skills training (all types)  [Supportive Therapy] : Supportive Therapy [de-identified] : The patient came in person for the session with his father, the patient permitted his father to be present during the session. The patient reported that he sleeps a lot because he is tired, sometimes 15 hrs, " i sleep because i enjoy sleeping". At this time the patient does work because his job hasn't called him. Also, the patietn stated that he takes an online course for software engineering, goes to the gym and sleeps. In addition, the patient's father reported that from time to time the patient eats to the point that he vomits. It seems as if the patient has to finish whatever he has on his plate and he eats really fast. The situation was discussed, and the therapist encouraged the patient to eat from the small plate so that he can't fill the plate to the point that it would be too much for the patient. The patient also stated that sometimes he is unable to fall asleep, sleep hygiene discussed, and the patient was provided with a printout for sleep hygiene tips and tricks. At this time the patient is not planning to go back to college. During the session, at times the patient was inappropriately laughing. The patient takes medications as prescribed. Throughout the session the patient was provided with active listening, motivational interviewing, emotional support and empathy.  [Recommended Frequency of Visits: ____] : Recommended frequency of visits: [unfilled] [Return in ____ week(s)] : Return in [unfilled] week(s) [FreeTextEntry1] : the therapist will meet the patient every 2-3 weeks

## 2025-01-07 NOTE — PHYSICAL EXAM
[Well groomed] : well groomed [Cooperative] : cooperative [Euthymic] : euthymic [Clear] : clear [[place]] : concrete [None] : none [None Reported] : none reported [Average] : average [Moderate] : moderate [WNL] : within normal limits [FreeTextEntry5] : mildly oddly related [de-identified] : Improved, more supple [de-identified] : paranoid delusions [0] : 0 [No] : No [FreeTextEntry1] : 1

## 2025-01-07 NOTE — DISCUSSION/SUMMARY
[Date of Last Annual Labs: _____] : Date of Last Annual Labs: [unfilled] [Tobacco Screening Completed?] : Tobacco Screening Completed: Yes [Date of Last AIMS: _____] : Date of Last AIMS: [unfilled] [Date of Last HbgA1c: _____] : Date of Last HbgA1c: [unfilled] [Date of Last Lipid Profile: _____] : Date of Last Lipid Profile: [unfilled] [FreeTextEntry1] : Michael is a 20 yo male, never , no children, domiciled with family (mother, father, 25yo brother, 20yo sister), in private residence, 3rd year of [employer] studying Accounting with a focus on  (also taking online courses at [employer]), previously self-employed part-time design, 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 PMH, PPH of schizoaffective bipolar type on invega sustenna HORN, multiple IPP admissions (first IPP admission 8/3/20-9/16/20 or new onset psychosis, second IPP in 2/17/21-3/25/21 for non-compliance with medications, third IPP at [facility] 03/28/24-04/04/24 for worsening psychosis and aggression , 4th IPP 4/3/24-4/18/24 for worsening psychosis/diana at [place], [place]), hx of medication trials on lithium and klonopin, hx of 8 bilateral ECT treatments with significant improvement in psychotic and mood symptoms, no pSA/NSSIB, no SI/HI, no substance use, no reported legal hx, who was previously a pt of Dr. Garcia through FEP (first episode psychosis) program, now a patient at [facility]  Per chart review from FEP, "Dario presented initially with changes in behavior since July 2020 leading to disorganized thought process/behavior and internal preoccupation with physically aggressive behavior resulting in first hospitalization at [facility] 8/3/20-9/16/20 consistent with diagnosis of Schizophrenia. He was on Risperdal and Klonopin, but stopped taking meds around late 2021 and early 2021 which led to his second hospitalization 2/17/21-3/25/21 for disorganized behavior, AH, and paranoia in the context of medication non-adherence. As per discharge summary, patient during hospitalization had several episodes of agitation, aggression due to his psychosis. Pt was started on Zyprexa, with plan to transition to HORN, and dose was titrated up to 20mg. Dario developed elevated LFTs and neutropenia, therefore was cross titrated to Invega and lithium + klonopin were also started for agitation and mood. Pt showed minimal improvement. ECT was then considered, and Lithium and Klonopin were tapered off. Pt had 8 Bilateral ECT treatments with significant improvement in psychotic and mood symptoms. He had no significant side effects from the injection. Michael was again hospitalized in September at [facility] for ECT, lithium and Klonopin were tapered off, and he received 8 ECT treatments.    At follow up today, patient continues to appear far less oddly related and internally preoccupied than prior to his hospitalization with ECT treatments. He denies any lingering paranoia and states that the voices he hears have decreased. He states his mood is good. Notes symptoms consistent with acid reflux, provider discussed treatment. Some akathesia noted, family agreeable to starting evening cogentin. Discussed maintenance ECT, [facility] team has already reached out to family to schedule. Patient has no acute decompensation of his psychiatric symptoms at this time, family has no acute safety concerns. Remains appropriate for outpatient level of care  Impression: schizoaffective disorder, bipolar type, with poor prognostic features  Plan: - Next HORN of Haldol scheduled for 02/04 - Cogentin 1 mg qhs for akathisia (family has medications) - FU CBC, CMP, TSH, lipid panel, HbA1c - Pt now amendable to maintenance ECT, [facility] team has established contact with family - Consider starting SGA to supplement haldol for psychotic symptoms - If pt does not start ECT, may consider lithium for mood symptoms - Pt sp Invega HORN 234 mg, lithium (1350) and Klonopin, may consider restarting if psychotic symptoms worsen - in future, may consider initiation of clozapine to replace the PO Haldol, however pt previously neutropenic on zyprexa and had elevated liver enzymes - Follow up with patient in 4 weeks

## 2025-01-07 NOTE — HISTORY OF PRESENT ILLNESS
[FreeTextEntry1] : Chart reviewed, patient seen in person with pt's father and mother.   At follow up today, patient appears linear, not paranoid, however at a couple of instances giggles inappropriately. He has a supple affect. He denies any lingering paranoia and states that he no longer has any auditory hallucinations.  He states his mood is good, and that he is sleeping and eating well. Notes that aprox twice a week he has chest pain lasting approximately ten seconds, most often while lying down, and accompanied by an acid taste in his mouth. Writer educated patient on eat more slowly and staying upright after meals. States he is sleeping and eating well. Denies any AE of Haldol including rigidity, spasticity, akathisia or involuntary movements.  Father and mother state that Michael continues to do well, they deny any acute safety concerns at this time. State that on Monday the [facility] ECT team reached out to them. Provider answered a number of questions regarding ECT, and provided psychoeducation. In addition, mother noted concerns over further increasing the amount of medications Michael is currently on. Mother mentions that Michael occasionally paces, agreeable to restarting cogenitn 1 mg qhs for EPS.  [FreeTextEntry2] : Hospitalization at [facility] 8/3/20 to 9/16/20. ER records describe patient as "disorganized with constricted affect, behavior changes at home, increased aggressivity and internally preoccupied." He was stabilized on Risperdal and Klonopin. Pt then stopped taking meds, and was admitted 2/17/21-3/25/21 for decompensation. Pt was started on Zyprexa, with plan to transition to HORN, and dose was titrated up to 20mg. Dario developed elevated LFTs and neutropenia, therefore was cross titrated to Invega and lithium + klonopin were also started for agitation and mood. Pt showed minimal improvement. ECT was then considered, and Lithium and Klonopin were tapered off. Pt had 8 Bilateral ECT treatments with significant improvement in psychotic and mood symptoms. He received Invega 234mg on 5/19/21, 156mg on 5/24/21. He had no significant side effects from the injection. His next dose of 156mg is today and then one month from today

## 2025-01-22 NOTE — PLAN
[FreeTextEntry2] : Goal 1. The patient will recognize and improve daily symptoms of his schizoaffective disorder, bipolar type diagnosis as evidenced by an improvement in scores on mental health scales reviewed in therapy sessions every 1 year and get back to college to complete his degree.   Obj 1- The patient will attend bi weekly therapy sessions and will review symptoms and coping skills during sessions. The patient will practice 1 meaningful activity a day   Obj 2- The patient will take daily meds as prescribed and meet with psychiatrist as scheduled  [Cognitive and/or Behavior Therapy] : Cognitive and/or Behavior Therapy  [Interpersonal Therapy] : Interpersonal Therapy  [Motivational Interviewing] : Motivational Interviewing  [Psychodynamic Therapy] : Psychodynamic Therapy  [Psychoeducation] : Psychoeducation  [Skills training (all types)] : Skills training (all types)  [Supportive Therapy] : Supportive Therapy [de-identified] : The patient came in person for the session. The patient came with his father, the patient permitted his father to be in the room. The patient stated that he goes to the gym every day, sleeps a lot, plays video games and takes online courses for software engineering.  The patient reported a good mood and is looking forward to going to [employer] after the session. 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 During the session the patient giggled inappropriately a number of times. In addition, the patient had blood work done as well as a medical form filled out for ECT maintenance. Overall, the patient is at his baseline, no issues or concerns reported. The patient takes medications as prescribed. Throughout the session the patient was provided with active listening, motivational interviewing, emotional support and empathy.  [Recommended Frequency of Visits: ____] : Recommended frequency of visits: [unfilled] [Return in ____ week(s)] : Return in [unfilled] week(s) [FreeTextEntry1] : the therapist will meet the patient every 2-3 weeks

## 2025-01-22 NOTE — PLAN
[FreeTextEntry2] : Goal 1. The patient will recognize and improve daily symptoms of his schizoaffective disorder, bipolar type diagnosis as evidenced by an improvement in scores on mental health scales reviewed in therapy sessions every 1 year and get back to college to complete his degree.   Obj 1- The patient will attend bi weekly therapy sessions and will review symptoms and coping skills during sessions. The patient will practice 1 meaningful activity a day   Obj 2- The patient will take daily meds as prescribed and meet with psychiatrist as scheduled  [Cognitive and/or Behavior Therapy] : Cognitive and/or Behavior Therapy  [Interpersonal Therapy] : Interpersonal Therapy  [Motivational Interviewing] : Motivational Interviewing  [Psychodynamic Therapy] : Psychodynamic Therapy  [Psychoeducation] : Psychoeducation  [Skills training (all types)] : Skills training (all types)  [Supportive Therapy] : Supportive Therapy [de-identified] : The patient came in person for the session. The patient came with his father, the patient permitted his father to be in the room. The patient stated that he goes to the gym every day, sleeps a lot, plays video games and takes online courses for software engineering.  The patient reported a good mood and is looking forward to going to [employer] after the session. 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 During the session the patient giggled inappropriately a number of times. In addition, the patient had blood work done as well as a medical form filled out for ECT maintenance. Overall, the patient is at his baseline, no issues or concerns reported. The patient takes medications as prescribed. Throughout the session the patient was provided with active listening, motivational interviewing, emotional support and empathy.  [Recommended Frequency of Visits: ____] : Recommended frequency of visits: [unfilled] [Return in ____ week(s)] : Return in [unfilled] week(s) [FreeTextEntry1] : the therapist will meet the patient every 2-3 weeks

## 2025-01-22 NOTE — REASON FOR VISIT
[Other Location: e.g. Home (Enter Location, City,State)___] : The patient, [unfilled], was located at [unfilled] at the time of the visit. [FreeTextEntry4] : 10 am [FreeTextEntry5] : 10 45am [Patient] : Patient [FreeTextEntry1] : therapy f/u [Father] : father

## 2025-02-04 NOTE — DISCUSSION/SUMMARY
[Date of Last Annual Labs: _____] : Date of Last Annual Labs: [unfilled] [Tobacco Screening Completed?] : Tobacco Screening Completed: Yes [Date of Last AIMS: _____] : Date of Last AIMS: [unfilled] [Date of Last HbgA1c: _____] : Date of Last HbgA1c: [unfilled] [Date of Last Lipid Profile: _____] : Date of Last Lipid Profile: [unfilled] [FreeTextEntry1] : Writer reached out to [facility] FLORESITA txt to discuss patient's status. They state that they are waiting for Michael' medical clearance. Gave contact numbers of  [phone] or [phone]; provider relayed this info to Michael' mother via her VM.

## 2025-02-04 NOTE — HISTORY OF PRESENT ILLNESS
[FreeTextEntry1] : Chart reviewed, patient seen in person with pt's father.   At follow up today, patient appears linear, not paranoid, however at a couple of instances giggles inappropriately. He has a supple affect. He denies any lingering paranoia but states that sometimes he hears "nonverbal speech", describing garbled words that he hears someone saying generally upon waking or falling asleep.   He states his mood is good, and that he is sleeping and eating well. States that he is no longer vomiting after eating, and that he is slowing down on eating large portions. Denies any AE of Haldol including rigidity, spasticity, akathisia or involuntary movements.  Father and mother state that Michael continues to do well, they deny any acute safety concerns at this time. State that they have yet to start the series treatment, provider said she would reach out to the ECT team as patient and family believe they have completed the bloodwork and physical exam necessary and are just waiting to be contacted by the ECT team. State that pacing has improved, the family was not giving Dario the Cogentin, do not believe pacing/akathisia to be a problem at this time.  [FreeTextEntry2] : Hospitalization at [facility] 8/3/20 to 9/16/20. ER records describe patient as "disorganized with constricted affect, behavior changes at home, increased aggressivity and internally preoccupied." He was stabilized on Risperdal and Klonopin. Pt then stopped taking meds, and was admitted 2/17/21-3/25/21 for decompensation. Pt was started on Zyprexa, with plan to transition to HORN, and dose was titrated up to 20mg. Dario developed elevated LFTs and neutropenia, therefore was cross titrated to Invega and lithium + klonopin were also started for agitation and mood. Pt showed minimal improvement. ECT was then considered, and Lithium and Klonopin were tapered off. Pt had 8 Bilateral ECT treatments with significant improvement in psychotic and mood symptoms. He received Invega 234mg on 5/19/21, 156mg on 5/24/21. He had no significant side effects from the injection. His next dose of 156mg is today and then one month from today

## 2025-02-04 NOTE — HISTORY OF PRESENT ILLNESS
[FreeTextEntry1] : Chart reviewed, patient seen in person with pt's father.   At follow up today, patient appears linear, not paranoid, however at a couple of instances giggles inappropriately. He has a supple affect. He denies any lingering paranoia but states that sometimes he hears "nonverbal speech", describing garbled words that he hears someone saying generally upon waking or falling asleep.   He states his mood is good, and that he is sleeping and eating well. States that he is no longer vomiting after eating, and that he is slowing down on eating large portions. Denies any AE of Haldol including rigidity, spasticity, akathisia or involuntary movements.  Father and mother state that Michael continues to do well, they deny any acute safety concerns at this time. State that they have yet to start the series treatment, provider said she would reach out to the ECT team as patient and family believe they have completed the bloodwork and physical exam necessary and are just waiting to be contacted by the ECT team. State that pacing has improved, the family was not giving Dario the Cogentin, do not believe pacing/akathisia to be a problem at this time.  [FreeTextEntry2] : Hospitalization at [facility] 8/3/20 to 9/16/20. ER records describe patient as "disorganized with constricted affect, behavior changes at home, increased aggressivity and internally preoccupied." He was stabilized on Risperdal and Klonopin. Pt then stopped taking meds, and was admitted 2/17/21-3/25/21 for decompensation. Pt was started on Zyprexa, with plan to transition to HORN, and dose was titrated up to 20mg. Dario developed elevated LFTs and neutropenia, therefore was cross titrated to Invega and lithium + klonopin were also started for agitation and mood. Pt showed minimal improvement. ECT was then considered, and Lithium and Klonopin were tapered off. Pt had 8 Bilateral ECT treatments with significant improvement in psychotic and mood symptoms. He received Invega 234mg on 5/19/21, 156mg on 5/24/21. He had no significant side effects from the injection. His next dose of 156mg is today and then one month from today

## 2025-02-04 NOTE — DISCUSSION/SUMMARY
[Date of Last Annual Labs: _____] : Date of Last Annual Labs: [unfilled] [Tobacco Screening Completed?] : Tobacco Screening Completed: Yes [Date of Last AIMS: _____] : Date of Last AIMS: [unfilled] [Date of Last HbgA1c: _____] : Date of Last HbgA1c: [unfilled] [Date of Last Lipid Profile: _____] : Date of Last Lipid Profile: [unfilled] [FreeTextEntry1] : Writer reached out to [facility] FLORESITA txt to discuss patient's status. They state that they are waiting for Michael' medical clearance. Gave contact numbers of  [phone] or [phone]; provider relayed this info to Michael' mother via her VM.

## 2025-02-04 NOTE — PHYSICAL EXAM
[Well groomed] : well groomed [Cooperative] : cooperative [Euthymic] : euthymic [Clear] : clear [[place]] : concrete [None] : none [None Reported] : none reported [Average] : average [WNL] : within normal limits [Moderate] : moderate [0] : 0 [No] : No [FreeTextEntry5] : oddly related, inappropriately giggling [de-identified] : Improved, more supple [de-identified] : paranoid delusions [FreeTextEntry1] : 1

## 2025-02-04 NOTE — PHYSICAL EXAM
[Well groomed] : well groomed [Cooperative] : cooperative [Euthymic] : euthymic [Clear] : clear [[place]] : concrete [None] : none [None Reported] : none reported [Average] : average [WNL] : within normal limits [Moderate] : moderate [0] : 0 [No] : No [FreeTextEntry5] : oddly related, inappropriately giggling [de-identified] : Improved, more supple [de-identified] : paranoid delusions [FreeTextEntry1] : 1

## 2025-02-04 NOTE — TREATMENT
[No Side Effects] : No side effects [FreeTextEntry4] : Michael was seen today for Haldol Decanoate 150 mg IM in the left deltoid muscle. Area cleansed prior to and without signs or symptoms of adverse reaction after administration. Medication education reviewed with Michael he denies any side effects or concerns at this time. Michael was pleasant and cooperative with appointment. He also had an appointment with Dr. Flores please refer to that note for a mental status. He will return to this clinic on 3/4 to meet with Dr Flores and this writer for injection.

## 2025-02-20 NOTE — PHYSICAL EXAM
[6 - Severely ill] : 6 - Severely ill  (disruptive pathology, behavior and function are frequently influenced by symptoms, may require assistance from others) [4 - No change] : 4 - No change  (symptoms remain essentially unchanged)

## 2025-02-20 NOTE — PLAN
[FreeTextEntry2] : Goal 1. The patient will recognize and improve daily symptoms of his schizoaffective disorder, bipolar type diagnosis as evidenced by an improvement in scores on mental health scales reviewed in therapy sessions every 1 year and get back to college to complete his degree.   Obj 1- The patient will attend bi weekly therapy sessions and will review symptoms and coping skills during sessions. The patient will practice 1 meaningful activity a day   Obj 2- The patient will take daily meds as prescribed and meet with psychiatrist as scheduled  [Cognitive and/or Behavior Therapy] : Cognitive and/or Behavior Therapy  [Interpersonal Therapy] : Interpersonal Therapy  [Motivational Interviewing] : Motivational Interviewing  [Psychodynamic Therapy] : Psychodynamic Therapy  [Psychoeducation] : Psychoeducation  [Skills training (all types)] : Skills training (all types)  [Supportive Therapy] : Supportive Therapy [de-identified] : The therapist met the patient in person for the session. The patient came with his father, the patient permitted his father to be present during the session. The patient was not reliable with his answers, first he stated that his depression is 3/10 and anxiety 4/10 then he stated that he has no depression and anxiety. The patient also reported no visual or auditory hallucinations, no SI/HI, no paranoia but was inappropriately giggling during the session. Also, the patient goes to the gym 3-5/week, walks in the community. At the end of the session the patient shared that "sometimes i feel that my brain hurts and i feel the bubble in my heart sometimes." The patient was unable to explain his thoughts and feelings further, but the patient's father stated that the patient probably described acid reflux he sometimes has. Overall, the patient is at his baseline and there were no new issues reported. The patient takes medications as prescribed. Throughout the session the patient was provided with active listening, motivational interviewing, emotional support and empathy.   [Recommended Frequency of Visits: ____] : Recommended frequency of visits: [unfilled] [Return in ____ week(s)] : Return in [unfilled] week(s) [FreeTextEntry1] : the therapist will meet the patient every 2-3 weeks

## 2025-03-04 NOTE — TREATMENT
[No Side Effects] : No side effects [FreeTextEntry4] : Michael was seen today for Haldol Decanoate 150mg IM in the right deltoid muscle. Area cleansed prior to and without signs or symptoms of adverse reaction after administration. Medication education reviewed with Michael he denies any side effects or concerns at this time. Michael does appear to be preoccupied with pacing and reported altered sleep pattern. He also had an appointment with Dr. Flores please refer to that note for a mental status. After Dr Flores's assessment decision was made to have Michael further evaluated in ED. 911 was activated and he was escorted by Police and ambulance to [facility] ED for further evaluation. His parents both expressed their displeasure with the decision. Dad in person and Mom on the phone. Allowed them time to verbalize their concerns and Dad accompanied Michael. They are scheduled to return 4/1/2025 to meet with Dr. Flores and this writer at this time.

## 2025-03-04 NOTE — PHYSICAL EXAM
[None] : none [Cooperative] : cooperative [Euthymic] : euthymic [Clear] : clear [[place]] : concrete [None Reported] : none reported [Average] : average [WNL] : within normal limits [Moderate] : moderate [0] : 0 [No] : No [Disheveled] : disheveled [Impulsive] : impulsive [Inappropriate] : inappropriate [Circumstantial] : circumstantial [Tangential] : tangential [Paranoid] : paranoid [Phobic] : phobic [Hallucinations - Auditory] : hallucinations - auditory [Hallucinations - Command] : hallucinations - command [de-identified] : Keeps standing up during the appointment to leave before appointment is over [FreeTextEntry5] : oddly related, inappropriately giggling [de-identified] : paranoid delusions [FreeTextEntry1] : 1

## 2025-03-04 NOTE — PHYSICAL EXAM
[None] : none [Cooperative] : cooperative [Euthymic] : euthymic [Clear] : clear [[place]] : concrete [None Reported] : none reported [Average] : average [WNL] : within normal limits [Moderate] : moderate [0] : 0 [No] : No [Disheveled] : disheveled [Impulsive] : impulsive [Inappropriate] : inappropriate [Circumstantial] : circumstantial [Tangential] : tangential [Paranoid] : paranoid [Phobic] : phobic [Hallucinations - Auditory] : hallucinations - auditory [Hallucinations - Command] : hallucinations - command [de-identified] : Keeps standing up during the appointment to leave before appointment is over [FreeTextEntry5] : oddly related, inappropriately giggling [de-identified] : paranoid delusions [FreeTextEntry1] : 1

## 2025-03-04 NOTE — HISTORY OF PRESENT ILLNESS
[FreeTextEntry1] : Chart reviewed, patient seen in person with pt's father.   At follow up today, patient appears bizzare, giggles inappropriately, and his thinking does not appear linear. He states that he did not sleep at all last night, and mentions that his sleep cycle has been altered. Father denies that Michael has been exhibiting any unsafe behaviors but does state that he has been pacing (states that he has not been taking the cogentin because "it does not help", has had an altered sleep cycle, and has been laughing inappropriately.   When interviewed in private from his father he confides that he has been feeling paranoid lately, "not safe" and that "people are out to get me." He states that he believes people online are out to hurt him, and he states that he has been posting things online because of this such as "I want to die in this bitch." He also states that he has been having auditory hallucinations, including command auditory hallucinations. Patient does not want an inpatient hospitalization at this time, however provider discussed with patient and father that given Michael' paranoia that he has been acting on, and the decompensation of his psychosis, he was require additional evaluation by an ED psychiatrist and EMS would need to be called.  Patient denies any SI/HI   [FreeTextEntry2] : Hospitalization at [facility] 8/3/20 to 9/16/20. ER records describe patient as "disorganized with constricted affect, behavior changes at home, increased aggressivity and internally preoccupied." He was stabilized on Risperdal and Klonopin. Pt then stopped taking meds, and was admitted 2/17/21-3/25/21 for decompensation. Pt was started on Zyprexa, with plan to transition to HORN, and dose was titrated up to 20mg. Dario developed elevated LFTs and neutropenia, therefore was cross titrated to Invega and lithium + klonopin were also started for agitation and mood. Pt showed minimal improvement. ECT was then considered, and Lithium and Klonopin were tapered off. Pt had 8 Bilateral ECT treatments with significant improvement in psychotic and mood symptoms. He received Invega 234mg on 5/19/21, 156mg on 5/24/21. He had no significant side effects from the injection. His next dose of 156mg is today and then one month from today

## 2025-03-04 NOTE — HISTORY OF PRESENT ILLNESS
[FreeTextEntry1] : Chart reviewed, patient seen in person with pt's father.   At follow up today, patient appears bizzare, giggles inappropriately, and his thinking does not appear linear. He states that he did not sleep at all last night, and mentions that his sleep cycle has been altered. Father denies that Michael has been exhibiting any unsafe behaviors but does state that he has been pacing (states that he has not been taking the cogentin because "it does not help", has had an altered sleep cycle, and has been laughing inappropriately.   When interviewed in private from his father he confides that he has been feeling paranoid lately, "not safe" and that "people are out to get me." He states that he believes people online are out to hurt him, and he states that he has been posting things online because of this such as "I want to die in this bitch." He also states that he has been having auditory hallucinations, including command auditory hallucinations. Patient does not want an inpatient hospitalization at this time, however provider discussed with patient and father that given Michael' paranoia that he has been acting on, and the decompensation of his psychosis, he was require additional evaluation by an ED psychiatrist and EMS would need to be called.  Patient denies any SI/HI   [FreeTextEntry2] : Hospitalization at [email or web address] 8/3/20 to 9/16/20. ER records describe patient as "disorganized with constricted affect, behavior changes at home, increased aggressivity and internally preoccupied." He was stabilized on Risperdal and Klonopin. Pt then stopped taking meds, and was admitted 2/17/21-3/25/21 for decompensation. Pt was started on Zyprexa, with plan to transition to HORN, and dose was titrated up to 20mg. Dario developed elevated LFTs and neutropenia, therefore was cross titrated to Invega and lithium + klonopin were also started for agitation and mood. Pt showed minimal improvement. ECT was then considered, and Lithium and Klonopin were tapered off. Pt had 8 Bilateral ECT treatments with significant improvement in psychotic and mood symptoms. He received Invega 234mg on 5/19/21, 156mg on 5/24/21. He had no significant side effects from the injection. His next dose of 156mg is today and then one month from today

## 2025-03-05 NOTE — DISCUSSION/SUMMARY
[Date of Last Annual Labs: _____] : Date of Last Annual Labs: [unfilled] [Tobacco Screening Completed?] : Tobacco Screening Completed: Yes [Date of Last AIMS: _____] : Date of Last AIMS: [unfilled] [Date of Last HbgA1c: _____] : Date of Last HbgA1c: [unfilled] [Date of Last Lipid Profile: _____] : Date of Last Lipid Profile: [unfilled] [FreeTextEntry1] : Writer called patient's fatherCristiano at [phone]: Writer strongly advised father to bring Michael in for an appointment prior to when his next injection is due, offered March 14th as a time. Writer expressed concerns that Michael is currently experiencing paranoia and a decompensation of his psychosis, however Cristiano expressed disagreement and adamantly refuses to present prior to Michael' next scheduled injection. Writer discussed conversation with attending, therapist and rest of team.

## 2025-03-05 NOTE — DISCUSSION/SUMMARY
[Date of Last Annual Labs: _____] : Date of Last Annual Labs: [unfilled] [Tobacco Screening Completed?] : Tobacco Screening Completed: Yes [Date of Last AIMS: _____] : Date of Last AIMS: [unfilled] [Date of Last HbgA1c: _____] : Date of Last HbgA1c: [unfilled] [Date of Last Lipid Profile: _____] : Date of Last Lipid Profile: [unfilled] [FreeTextEntry1] : Writer called patient's fatherCristiano at [phone]: Writer strongly advised father to bring Michael in for an appointment prior to when his next injection is due, offered March 14th as a time. Writer expressed concerns that Michael is currently experiencing paranoia and a decompensation of his psychosis, however Cristiano expressed disagreement and adamantly refuses to present prior to Michael' next scheduled injection. Writer discussed conversation with attending, therapist and rest of team.

## 2025-03-06 NOTE — DISCUSSION/SUMMARY
[FreeTextEntry1] : Racheal, pt's mom called. States that Michael is doing OK, she and the father are keeping a close eye on him and do not believe he is at acute risk of harm to himself or others at this time. States that she is working on getting ECT started for him. Agreeable to making virtual appointment on March 14th for a follow up for Michael.

## 2025-03-12 NOTE — PLAN
[Cognitive and/or Behavior Therapy] : Cognitive and/or Behavior Therapy  [Interpersonal Therapy] : Interpersonal Therapy  [Motivational Interviewing] : Motivational Interviewing  [Psychodynamic Therapy] : Psychodynamic Therapy  [Psychoeducation] : Psychoeducation  [Skills training (all types)] : Skills training (all types)  [Supportive Therapy] : Supportive Therapy [Recommended Frequency of Visits: ____] : Recommended frequency of visits: [unfilled] [Return in ____ week(s)] : Return in [unfilled] week(s) [FreeTextEntry2] : Goal 1. The patient will recognize and improve daily symptoms of his schizoaffective disorder, bipolar type diagnosis as evidenced by an improvement in scores on mental health scales reviewed in therapy sessions every 1 year and get back to college to complete his degree.   Obj 1- The patient will attend bi weekly therapy sessions and will review symptoms and coping skills during sessions. The patient will practice 1 meaningful activity a day   Obj 2- The patient will take daily meds as prescribed and meet with psychiatrist as scheduled  [de-identified] : The patient was seen in person for the session. The patient came with his father, the patient gave permission for his father to be present during the session. The patient reported that he is doing great and that he had his ECT session yesterday. The patient wasn't sure how many ECT sessions were recommended. The patient was calm and a bit sleepy with some inappropriate giggling. The patient reported no SI/HI/no plan or intent. The patient also stated that he has no auditorial or visual hallucinations, according to the patient the last time he had some auditory hallucinations about a month ago. The patient stated that he had mix of men and women voices but wasn't sure what they were telling the patient. The patient reported that he feels better after the ECT and plans to eat some fast food with his father after the session. No new issues of concerns were brought up during the session apart from the father did feel that the patient needed to go to the ER last time he visited the outpatient clinic. The patient takes medications as prescribed. Throughout the session the patient was provided with active listening, motivational interviewing, emotional support and empathy.  [FreeTextEntry1] : the therapist will meet the patient every 2-3 weeks

## 2025-03-12 NOTE — REASON FOR VISIT
[Other Location: e.g. Home (Enter Location, City,State)___] : The patient, [unfilled], was located at [unfilled] at the time of the visit. [Patient] : Patient [Father] : father [FreeTextEntry4] : 10 am [FreeTextEntry5] : 10 45am [FreeTextEntry1] : therapy f/u

## 2025-04-01 NOTE — HISTORY OF PRESENT ILLNESS
[FreeTextEntry1] : Chart reviewed, patient seen in person with pt's father.  Patient appears less gaurded, paranoid, continues to giggle inappropriately at times. He states that he is doing "good", that he has been keeping busy playing video games, working out, and running errands with his father. He denies any fear that people are out to get him or his family. He states that he does hear voices at times, which he describes as mostly being "gibberish, in the back of my head", coming from "multiple voices" that fades into the background when others are speaking to him. He states that occasionally he has CAH of "work out" or "eat more protein", but denies any CAH of voices telling him to hurt himself or others. States he is sleeping and eating well - states he is now throwing up less than previously after meals.  Spoke with mother Racheal over the phone who states leah has been calmer, has had more restful sleep, and has been able to attend classes online since starting ECT. Discussed potential benefits of lithium with parents and Michael, at this time mom would like to seek a second opinion, is concerned Michael would be on "too many" meds. Michael will now transition to ECT maintenance treatment.  Patient denies any SI/HI   [FreeTextEntry2] : Hospitalization at [facility] 8/3/20 to 9/16/20. ER records describe patient as "disorganized with constricted affect, behavior changes at home, increased aggressivity and internally preoccupied." He was stabilized on Risperdal and Klonopin. Pt then stopped taking meds, and was admitted 2/17/21-3/25/21 for decompensation. Pt was started on Zyprexa, with plan to transition to HORN, and dose was titrated up to 20mg. Dario developed elevated LFTs and neutropenia, therefore was cross titrated to Invega and lithium + klonopin were also started for agitation and mood. Pt showed minimal improvement. ECT was then considered, and Lithium and Klonopin were tapered off. Pt had 8 Bilateral ECT treatments with significant improvement in psychotic and mood symptoms. He received Invega 234mg on 5/19/21, 156mg on 5/24/21. He had no significant side effects from the injection. His next dose of 156mg is today and then one month from today

## 2025-04-01 NOTE — PLAN
[Medication education provided] : Medication education provided. [Rationale for medication choices, possible risks/precautions, benefits, alternative treatment choices, and consequences of non-treatment discussed] : Rationale for medication choices, possible risks/precautions, benefits, alternative treatment choices, and consequences of non-treatment discussed with patient/family/caregiver  [FreeTextEntry4] : Goal 2: iMchael will create meaningful personal, social, and academic engagements. Goal keyword or goal statement: Michael to work toward gaining employment.  Assessment of progress on goal/objective: Michael has enrolled in college and is a full time student. At present he doesn't want to work and would like to focus on his academics.

## 2025-04-01 NOTE — PHYSICAL EXAM
[None] : none [Cooperative] : cooperative [Euthymic] : euthymic [Clear] : clear [Circumstantial] : circumstantial [Tangential] : tangential [[place]] : concrete [None Reported] : none reported [Hallucinations - Auditory] : hallucinations - auditory [Average] : average [0] : 0 [No] : No [Well groomed] : well groomed [WNL] : within normal limits [Full] : full [Mild] : mild [FreeTextEntry5] : continues to be somewhat oddly related [FreeTextEntry7] : concrete [FreeTextEntry1] : 1

## 2025-04-01 NOTE — TREATMENT
[No Side Effects] : No side effects [FreeTextEntry4] : Michael was seen today for Haldol Decanoate 150 mg IM in the left deltoid muscle. Area cleansed prior to and without signs or symptoms of adverse reaction after administration. Medication education reviewed with Michael he denies any side effects or concerns at this time. Michael was cooperative and friendly during appointment. He also had an appointment with Dr. Flores please refer to that note for a mental status. Michael will return to this clinic on 4/30 to meet with Dr Flores and this writer for injection.

## 2025-04-01 NOTE — DISCUSSION/SUMMARY
[Date of Last Annual Labs: _____] : Date of Last Annual Labs: [unfilled] [Tobacco Screening Completed?] : Tobacco Screening Completed: Yes [Date of Last AIMS: _____] : Date of Last AIMS: [unfilled] [Date of Last HbgA1c: _____] : Date of Last HbgA1c: [unfilled] [Date of Last Lipid Profile: _____] : Date of Last Lipid Profile: [unfilled] [FreeTextEntry1] : Michael is a 20 yo male, never , no children, domiciled with family (mother, father, 27yo brother, 20yo sister), in private residence, 3rd year of [employer] studying Accounting with a focus on  (also taking online courses at [employer]), previously self-employed part-time design, 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 PMH, PPH of schizoaffective bipolar type on invega sustenna HORN, multiple IPP admissions (first IPP admission 8/3/20-9/16/20 or new onset psychosis, second IPP in 2/17/21-3/25/21 for non-compliance with medications, third IPP at [facility] 03/28/24-04/04/24 for worsening psychosis and aggression , 4th IPP 4/3/24-4/18/24 for worsening psychosis/diana at [place], [place]), hx of medication trials on lithium and klonopin, hx of 8 bilateral ECT treatments with significant improvement in psychotic and mood symptoms, no pSA/NSSIB, no SI/HI, no substance use, no reported legal hx, who was previously a pt of Dr. Garcia through FEP (first episode psychosis) program, now a patient at [facility]  Per chart review from [facility]EP, "Dario presented initially with changes in behavior since July 2020 leading to disorganized thought process/behavior and internal preoccupation with physically aggressive behavior resulting in first hospitalization at [facility] 8/3/20-9/16/20 consistent with diagnosis of Schizophrenia. He was on Risperdal and Klonopin, but stopped taking meds around late 2021 and early 2021 which led to his second hospitalization 2/17/21-3/25/21 for disorganized behavior, AH, and paranoia in the context of medication non-adherence. As per discharge summary, patient during hospitalization had several episodes of agitation, aggression due to his psychosis. Pt was started on Zyprexa, with plan to transition to HORN, and dose was titrated up to 20mg. Dario developed elevated LFTs and neutropenia, therefore was cross titrated to Invega and lithium + klonopin were also started for agitation and mood. Pt showed minimal improvement. ECT was then considered, and Lithium and Klonopin were tapered off. Pt had 8 Bilateral ECT treatments with significant improvement in psychotic and mood symptoms. He had no significant side effects from the injection. Michael was again hospitalized in September at [facility] for ECT, lithium and Klonopin were tapered off, and he received 8 ECT treatments. Michael again began receiving ECT series treatment in March for worsening paranoia, impulsivity, and worsening AH.   At follow up today, patient appears improved to last visit, much less paranoid and less impulsive. Parents note improvement as well. Discussed potential benefits of starting lithium for mood stabilization and as adjunct to antipsychotic for his schizoaffective disorder, however at this time parents want a second opinion and hesitate to start because they believe it will be "too many meds." Pt denies any akathisia, EPS, command AH to hurt himself or others, or paranoia. Continues to endorse AH with commands to work out/eat protein. Coordinated care today with Dr. Diop, who reaffirmed that he believes Michael requires ECT.  Impression: schizoaffective disorder, bipolar type, with poor prognostic features  Plan:  - Pt to move to maintenance ECT, care coordinated with Dr. Deepak Diop - Continue Haldol Dec 150 mg q4wks, last given 4/1, next to be administered 4/30 - Continue Cogentin 1 mg qhs for akathisia - Lipid panel from January 2025 revealed hyperlipidemia, discussed with patient and father - Consider starting SGA to supplement haldol for psychotic symptoms - Discussed starting low dose lithium now during maintenance ECT for schizoaffective disorder, however parents decline - Pt sp Invega HORN 234 mg, lithium (1350) and Klonopin, may consider restarting if psychotic symptoms worsen - in future, may consider initiation of clozapine, however pt previously neutropenic on zyprexa and had elevated liver enzymes

## 2025-04-02 NOTE — PLAN
[FreeTextEntry2] : Goal 1. The patient will recognize and improve daily symptoms of his schizoaffective disorder, bipolar type diagnosis as evidenced by an improvement in scores on mental health scales reviewed in therapy sessions every 1 year and get back to college to complete his degree.   Obj 1- The patient will attend bi weekly therapy sessions and will review symptoms and coping skills during sessions. The patient will practice 1 meaningful activity a day   Obj 2- The patient will take daily meds as prescribed and meet with psychiatrist as scheduled  [Cognitive and/or Behavior Therapy] : Cognitive and/or Behavior Therapy  [Interpersonal Therapy] : Interpersonal Therapy  [Motivational Interviewing] : Motivational Interviewing  [Psychodynamic Therapy] : Psychodynamic Therapy  [Psychoeducation] : Psychoeducation  [Skills training (all types)] : Skills training (all types)  [Supportive Therapy] : Supportive Therapy [de-identified] : The therapist met the patient in person for the session. The patient requested his father to be present at the session. According to the patient he is doing ok, no depression, no anxiety, no hallucinations, goes to the gym 3/week, spends time with his family and continues to take different professional courses online. Also, the patient continues to get ECT and will continue to have 1 ECT a week, completed the total of 7 as of today. The patient's father spoke about the day when the psychiatrist felt that the patient needed to be evaluated at the ER, the patient stated that he felt "embarrassed." The father was educated that the outpatient clinical staff has procedure to follow and there was nothing personal if that's how the father felt. Furthermore, the patient and the father felt that ECT works, the patient was calm and didn't giggle inappropriately as he used to. Overall, the patient is stable and will continue ECT 1/week.   [Recommended Frequency of Visits: ____] : Recommended frequency of visits: [unfilled] [Return in ____ week(s)] : Return in [unfilled] week(s) [FreeTextEntry1] : the therapist will meet the patient every 2-3 weeks

## 2025-04-23 NOTE — PHYSICAL EXAM
[6 - Severely ill] : 6 - Severely ill  (disruptive pathology, behavior and function are frequently influenced by symptoms, may require assistance from others) [2 - Much improved] : 2 - Much improved  (notably better with significant reduction of symptoms; increase in the level of functioning but some symptoms remain)  [Individual reports tobacco use during the last 30 days?] : Individual reports tobacco use during the last 30 days? No [Individual reports use of the following tobacco products during the last 30 days?] : Individual reports use of the following tobacco products during the last 30 days? No [Individual reports current use of tobacco cessation medication or nicotine replacement therapy?] : Individual reports current use of tobacco cessation medication or nicotine replacement therapy? No [Was tobacco cessation medication and/or nicotine replacement therapy recommended?] : Was tobacco cessation medication and/or nicotine replacement therapy recommended? No [Does individual accept referral to MD for cessation medication or NRT?] : Does individual accept referral to MD for cessation medication or NRT? No

## 2025-04-23 NOTE — PLAN
[Cognitive and/or Behavior Therapy] : Cognitive and/or Behavior Therapy  [Interpersonal Therapy] : Interpersonal Therapy  [Motivational Interviewing] : Motivational Interviewing  [Psychodynamic Therapy] : Psychodynamic Therapy  [Psychoeducation] : Psychoeducation  [Skills training (all types)] : Skills training (all types)  [Supportive Therapy] : Supportive Therapy [Recommended Frequency of Visits: ____] : Recommended frequency of visits: [unfilled] [Return in ____ week(s)] : Return in [unfilled] week(s) [FreeTextEntry2] : Goal 1. The patient will recognize and improve daily symptoms of his schizoaffective disorder, bipolar type diagnosis as evidenced by an improvement in scores on mental health scales reviewed in therapy sessions every 1 year  Obj 1- The patient will attend bi weekly therapy sessions and will review symptoms and coping skills during sessions. The patient will practice 1 meaningful activity a day   Obj 2- The patient will take daily meds as prescribed and meet with psychiatrist as scheduled  [de-identified] : The patient came in person for her session. The patient requested his father to be a part of the session. The patient reported that he is doing ok, even though he was sad last night because he was not sure if he was getting better or now. The therapist discussed with the patient what the patient would consider to be better. The patient continues to get ECT and moving forward might start getting maintenance EC every 2 weeks. The patient reported no auditory or visual hallucinations, "last time when i was hospitalized." Also, the patient goes to the gym 3-4/week with his brother. At this time the patient gave away his phone to his parents because in the past the patient was paranoid about his phone. While most of the time the patient goes outside with someone there are times when the patient is alone and since the patient has not phone and cant contact anyone and can't be reached the therapist discussed with the father air tag to be placed on the patient's key chain, as an option. The patient and his parents will discuss that option. Also, the therapist educated the patient and his father about applying for a disability since the patient doesn't work and has no source of income. The patient/father were provided with contact information to the social security office to call and get info on how to go about applying. The patient also completed PHQ9 and GAD7 scales and answered mostly "no" indicating that the patient has minimal depression and anxiety. However, the patient might not be reliable with his answers. The patient indicated that he was sad last night and when he was questioned about his depression as part of PHQ9 the patient stated that depression doesn't apply to him. The patient was encouraged to continue to go to the gym and be involved in meaningful activities during the day. No new issues or concerns reported. The patient takes medications as prescribed. Throughout the session the patient was provided with active listening, motivational interviewing, emotional support and empathy.  *** New treatment plan has been completed with the patient, this year the patient would like to focus on his mood rather than focusing on getting back to college, the patient's treatment plan has been revised. The patient will come to his session in person in most part, and being stable is the patient's long-term goal.  [FreeTextEntry1] : the therapist will meet the patient every 2-3 weeks

## 2025-04-23 NOTE — DISCUSSION/SUMMARY
[Plan Revision] : Plan Revision [Attempting to realize their potential] : Attempting to realize their potential [Motivated to participate in treatment] : motivated to participate in treatment [Motivated to maintain or improve physical health] : motivated to maintain or improve physical health [Has vocational interests or hobbies] : has vocational interests or hobbies [Part of a supportive family] : part of a supportive family [Housing stability] : housing stability [English fluency] : English fluency [Connected to healthcare] : connected to healthcare [FreeTextEntry2] : 5/3/26 [FreeTextEntry3] : 1/10/2024 [FreeTextEntry8] : none [Mental Health] : Mental Health [Revised - Rationale] : Revised - Rationale: [Continued - Progress made] : Continued - Progress made: [every ___ months] : every [unfilled] months [every ___ weeks] : every [unfilled] weeks [Other rationale for transition/discharge:] : Other rationale for transition/discharge: [None - Reason others did not participate:] : None - Reason others did not participate:  [Yes] : Yes [Psychiatric Provider/Prescriber] : Psychiatric Provider/Prescriber [Therapist] : Therapist [Supervisor (if needed)] : Supervisor [FreeTextEntry1] : schizoaffective disorder, bipolar type  [FreeTextEntry4] : " i would like to continue to stay stable and out of troubles" Goal 1. The patient will recognize and improve daily symptoms of his schizoaffective disorder, bipolar type diagnosis as evidenced by an improvement in scores on mental health scales reviewed in therapy sessions every 1 year  [de-identified] : The patient continues to get ECT and would like to work on his mood .  [de-identified] :  The patient will attend bi weekly therapy sessions and will review symptoms and coping skills during sessions. The patient will practice 1 meaningful activity a day   [de-identified] : 5/3/26 [de-identified] : The patient will take daily meds as prescribed and meet with psychiatrist as scheduled  [de-identified] : 5/3/26 [de-identified] : The patient attends all of his outpatient appointments as well as gets ECT treatment [FreeTextEntry5] : The therapist will utilize CBT techniques, Psychoeducation, Motivational interviewing and Supportive therapy  [de-identified] : Dr. Lara [de-identified] : Dylan Abbasi [de-identified] : When a patient no longer requires individual psychotherapy and medication in order to perform at baseline, the patient will be discharged.    [de-identified] : not clinically necessary

## 2025-04-23 NOTE — PLAN
[Cognitive and/or Behavior Therapy] : Cognitive and/or Behavior Therapy  [Interpersonal Therapy] : Interpersonal Therapy  [Motivational Interviewing] : Motivational Interviewing  [Psychodynamic Therapy] : Psychodynamic Therapy  [Psychoeducation] : Psychoeducation  [Skills training (all types)] : Skills training (all types)  [Supportive Therapy] : Supportive Therapy [Recommended Frequency of Visits: ____] : Recommended frequency of visits: [unfilled] [Return in ____ week(s)] : Return in [unfilled] week(s) [FreeTextEntry2] : Goal 1. The patient will recognize and improve daily symptoms of his schizoaffective disorder, bipolar type diagnosis as evidenced by an improvement in scores on mental health scales reviewed in therapy sessions every 1 year  Obj 1- The patient will attend bi weekly therapy sessions and will review symptoms and coping skills during sessions. The patient will practice 1 meaningful activity a day   Obj 2- The patient will take daily meds as prescribed and meet with psychiatrist as scheduled  [de-identified] : The patient came in person for her session. The patient requested his father to be a part of the session. The patient reported that he is doing ok, even though he was sad last night because he was not sure if he was getting better or now. The therapist discussed with the patient what the patient would consider to be better. The patient continues to get ECT and moving forward might start getting maintenance EC every 2 weeks. The patient reported no auditory or visual hallucinations, "last time when i was hospitalized." Also, the patient goes to the gym 3-4/week with his brother. At this time the patient gave away his phone to his parents because in the past the patient was paranoid about his phone. While most of the time the patient goes outside with someone there are times when the patient is alone and since the patient has not phone and cant contact anyone and can't be reached the therapist discussed with the father air tag to be placed on the patient's key chain, as an option. The patient and his parents will discuss that option. Also, the therapist educated the patient and his father about applying for a disability since the patient doesn't work and has no source of income. The patient/father were provided with contact information to the social security office to call and get info on how to go about applying. The patient also completed PHQ9 and GAD7 scales and answered mostly "no" indicating that the patient has minimal depression and anxiety. However, the patient might not be reliable with his answers. The patient indicated that he was sad last night and when he was questioned about his depression as part of PHQ9 the patient stated that depression doesn't apply to him. The patient was encouraged to continue to go to the gym and be involved in meaningful activities during the day. No new issues or concerns reported. The patient takes medications as prescribed. Throughout the session the patient was provided with active listening, motivational interviewing, emotional support and empathy.  *** New treatment plan has been completed with the patient, this year the patient would like to focus on his mood rather than focusing on getting back to college, the patient's treatment plan has been revised. The patient will come to his session in person in most part, and being stable is the patient's long-term goal.  [FreeTextEntry1] : the therapist will meet the patient every 2-3 weeks

## 2025-04-23 NOTE — DISCUSSION/SUMMARY
[Plan Revision] : Plan Revision [Attempting to realize their potential] : Attempting to realize their potential [Motivated to participate in treatment] : motivated to participate in treatment [Motivated to maintain or improve physical health] : motivated to maintain or improve physical health [Has vocational interests or hobbies] : has vocational interests or hobbies [Part of a supportive family] : part of a supportive family [Housing stability] : housing stability [English fluency] : English fluency [Connected to healthcare] : connected to healthcare [FreeTextEntry2] : 5/3/26 [FreeTextEntry3] : 1/10/2024 [FreeTextEntry8] : none [Mental Health] : Mental Health [Revised - Rationale] : Revised - Rationale: [Continued - Progress made] : Continued - Progress made: [every ___ months] : every [unfilled] months [every ___ weeks] : every [unfilled] weeks [Other rationale for transition/discharge:] : Other rationale for transition/discharge: [None - Reason others did not participate:] : None - Reason others did not participate:  [Yes] : Yes [Psychiatric Provider/Prescriber] : Psychiatric Provider/Prescriber [Therapist] : Therapist [Supervisor (if needed)] : Supervisor [FreeTextEntry1] : schizoaffective disorder, bipolar type  [FreeTextEntry4] : " i would like to continue to stay stable and out of troubles" Goal 1. The patient will recognize and improve daily symptoms of his schizoaffective disorder, bipolar type diagnosis as evidenced by an improvement in scores on mental health scales reviewed in therapy sessions every 1 year  [de-identified] : The patient continues to get ECT and would like to work on his mood .  [de-identified] : 5/3/26 [de-identified] :  The patient will attend bi weekly therapy sessions and will review symptoms and coping skills during sessions. The patient will practice 1 meaningful activity a day   [de-identified] : The patient will take daily meds as prescribed and meet with psychiatrist as scheduled  [de-identified] : 5/3/26 [de-identified] : The patient attends all of his outpatient appointments as well as gets ECT treatment [FreeTextEntry5] : The therapist will utilize CBT techniques, Psychoeducation, Motivational interviewing and Supportive therapy  [de-identified] : Dr. Lara [de-identified] : Dylan Abbasi [de-identified] : When a patient no longer requires individual psychotherapy and medication in order to perform at baseline, the patient will be discharged.    [de-identified] : not clinically necessary

## 2025-04-30 NOTE — DISCUSSION/SUMMARY
[Date of Last Annual Labs: _____] : Date of Last Annual Labs: [unfilled] [Tobacco Screening Completed?] : Tobacco Screening Completed: Yes [Date of Last AIMS: _____] : Date of Last AIMS: [unfilled] [Date of Last HbgA1c: _____] : Date of Last HbgA1c: [unfilled] [Date of Last Lipid Profile: _____] : Date of Last Lipid Profile: [unfilled] [FreeTextEntry1] : Michael is a 20 yo male, never , no children, domiciled with family (mother, father, 25yo brother, 22yo sister), in private residence, 3rd year of [employer] studying Accounting with a focus on  (also taking online courses at [employer]), previously self-employed part-time design, 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 PMH, PPH of schizoaffective bipolar type on invega sustenna HORN, multiple IPP admissions (first IPP admission 8/3/20-9/16/20 or new onset psychosis, second IPP in 2/17/21-3/25/21 for non-compliance with medications, third IPP at [facility] 03/28/24-04/04/24 for worsening psychosis and aggression , 4th IPP 4/3/24-4/18/24 for worsening psychosis/diana at [place], [place]), hx of medication trials on lithium and klonopin, hx of 8 bilateral ECT treatments with significant improvement in psychotic and mood symptoms, no pSA/NSSIB, no SI/HI, no substance use, no reported legal hx, who was previously a pt of Dr. Garcia through FEP (first episode psychosis) program, now a patient at [facility]  Per chart review from FEP, "Dario presented initially with changes in behavior since July 2020 leading to disorganized thought process/behavior and internal preoccupation with physically aggressive behavior resulting in first hospitalization at [facility] 8/3/20-9/16/20 consistent with diagnosis of Schizophrenia. He was on Risperdal and Klonopin, but stopped taking meds around late 2021 and early 2021 which led to his second hospitalization 2/17/21-3/25/21 for disorganized behavior, AH, and paranoia in the context of medication non-adherence. As per discharge summary, patient during hospitalization had several episodes of agitation, aggression due to his psychosis. Pt was started on Zyprexa, with plan to transition to HRON, and dose was titrated up to 20mg. Dario developed elevated LFTs and neutropenia, therefore was cross titrated to Invega and lithium + klonopin were also started for agitation and mood. Pt showed minimal improvement. ECT was then considered, and Lithium and Klonopin were tapered off. Pt had 8 Bilateral ECT treatments with significant improvement in psychotic and mood symptoms. He had no significant side effects from the injection. Michael was again hospitalized in September at [facility] for ECT, lithium and Klonopin were tapered off, and he received 8 ECT treatments. Michael again began receiving ECT series treatment in March for worsening paranoia, impulsivity, and worsening AH.   At follow up today, patient appears improved to last visit, less internally preoccupied, no longer giggling inappropriately. Currently on weekly maintenance ECT.  Pt denies any akathisia, EPS, command AH to hurt himself or others, or paranoia. Continues to endorse AH with commands to work out/eat protein.   Impression: schizoaffective disorder, bipolar type, with poor prognostic features  Plan:  - Pt to continue weekly maintenance ECT, care coordinated with Dr. Deepak Diop last on 4/28 - Continue Haldol Dec 150 mg q4wks, last given 4/30, next to be administered 5/27 - Continue Cogentin 1 mg qhs for akathisia - Lipid panel from January 2025 revealed hyperlipidemia, discussed with patient and father - Consider starting SGA to supplement haldol for psychotic symptoms - Discussed starting low dose lithium now during maintenance ECT for schizoaffective disorder, however parents decline - Pt sp Invega HORN 234 mg, lithium (1350) and Klonopin, may consider restarting if psychotic symptoms worsen - in future, may consider initiation of clozapine, however pt previously neutropenic on zyprexa and had elevated liver enzymes - Pt referred to [facility]

## 2025-04-30 NOTE — TREATMENT
[No Side Effects] : No side effects [FreeTextEntry4] : Michael received today alert and oriented with mother. Came in today for long-acting injectable, Haloperidol Decanoate 150 mg, administered to right deltoid. Area was cleansed, free of any signs of adverse effects. Patient denies any side effects. Endorses last auditory hallucination six months ago. Education provided to patient and mother. Mom requesting information regarding [facility], [facility], and [facility]. Information provided.

## 2025-04-30 NOTE — HISTORY OF PRESENT ILLNESS
[FreeTextEntry1] : Chart reviewed, patient seen in person with pt'jackiether.  Patient appears euthymic, not internally preoccupied, not giggling inappropriately. He denies any fear that people are out to get him or his family. He states that he last heard voices for the past week, and has not had any paranoid thoughts "for the last 6 months". States he is sleeping and eating well. Denies any restlessness, stiffness, or other AE from the medications. States he is tolerating ECT well and is now still receiving weekly maintenance therapy.  Spoke with mother Racheal, states that she has no acute concerns for Michael at this time. Advocates for Michael to have 1 long term psychiatrist, they are looking into other providers who can offer this (a non resident clinic). Nurse suggested that Michael look into [facility] for additional social/occupational services.  Patient denies any SI/HI   [FreeTextEntry2] : Hospitalization at [facility] 8/3/20 to 9/16/20. ER records describe patient as "disorganized with constricted affect, behavior changes at home, increased aggressivity and internally preoccupied." He was stabilized on Risperdal and Klonopin. Pt then stopped taking meds, and was admitted 2/17/21-3/25/21 for decompensation. Pt was started on Zyprexa, with plan to transition to HORN, and dose was titrated up to 20mg. Dario developed elevated LFTs and neutropenia, therefore was cross titrated to Invega and lithium + klonopin were also started for agitation and mood. Pt showed minimal improvement. ECT was then considered, and Lithium and Klonopin were tapered off. Pt had 8 Bilateral ECT treatments with significant improvement in psychotic and mood symptoms. He received Invega 234mg on 5/19/21, 156mg on 5/24/21. He had no significant side effects from the injection. His next dose of 156mg is today and then one month from today

## 2025-04-30 NOTE — PHYSICAL EXAM
[None] : none [Well groomed] : well groomed [Cooperative] : cooperative [Euthymic] : euthymic [Full] : full [Clear] : clear [Circumstantial] : circumstantial [Tangential] : tangential [[place]] : concrete [None Reported] : none reported [Average] : average [Mild] : mild [0] : 0 [No] : No [WNL] : within normal limits [FreeTextEntry5] : continues to be somewhat oddly related but improved; no longer giggling [FreeTextEntry7] : concrete [FreeTextEntry1] : 1

## 2025-05-22 NOTE — PLAN
[FreeTextEntry2] : Goal 1. The patient will recognize and improve daily symptoms of his schizoaffective disorder, bipolar type diagnosis as evidenced by an improvement in scores on mental health scales reviewed in therapy sessions every 1 year  Obj 1- The patient will attend bi weekly therapy sessions and will review symptoms and coping skills during sessions. The patient will practice 1 meaningful activity a day   Obj 2- The patient will take daily meds as prescribed and meet with psychiatrist as scheduled  [Cognitive and/or Behavior Therapy] : Cognitive and/or Behavior Therapy  [Interpersonal Therapy] : Interpersonal Therapy  [Motivational Interviewing] : Motivational Interviewing  [Psychodynamic Therapy] : Psychodynamic Therapy  [Psychoeducation] : Psychoeducation  [Skills training (all types)] : Skills training (all types)  [Supportive Therapy] : Supportive Therapy [de-identified] : The therapist met the patient for his session today. The patient reported that his mood is ok, no hallucinations, and he has no depression or anxiety. Also, the patient goes to the gym 5/week and stays home and plays video games with his brother. In addition, the patient's father stated that moving forward the patient will be getting his ECT 1/month.  The therapist encouraged the patinet to cotninue to be active. The patient takes medications as prescribed. Throughout the session the patient was provided with active listening, motivational interviewing, emotional support and empathy.  [Recommended Frequency of Visits: ____] : Recommended frequency of visits: [unfilled] [Return in ____ week(s)] : Return in [unfilled] week(s) [FreeTextEntry1] : the therapist will meet the patient every 2-3 weeks

## 2025-05-22 NOTE — REASON FOR VISIT
[Other Location: e.g. Home (Enter Location, City,State)___] : The patient, [unfilled], was located at [unfilled] at the time of the visit. [FreeTextEntry4] : 10 am [FreeTextEntry5] : 1030am [Patient] : Patient [Father] : father [FreeTextEntry1] : therapy f/u

## 2025-05-29 NOTE — PHYSICAL EXAM
[None] : none [Well groomed] : well groomed [Cooperative] : cooperative [Euthymic] : euthymic [Full] : full [Clear] : clear [Circumstantial] : circumstantial [Tangential] : tangential [[place]] : concrete [None Reported] : none reported [Average] : average [WNL] : within normal limits [Mild] : mild [0] : 0 [No] : No [FreeTextEntry5] : continues to be somewhat oddly related but improved; no longer giggling [FreeTextEntry7] : concrete [FreeTextEntry1] : 1

## 2025-05-29 NOTE — TREATMENT
[No Side Effects] : No side effects [FreeTextEntry4] : Michael was seen today for Haldol Decanoate 150mg IM in the right deltoid muscle. Area cleasnsed prior to and without signs or symptoms of adverse reaction after administration. Medication education reviewed with Michael he denies any side effects or concerns at this time. Michael reports his mood is "good". He was friendly and cooperative with appointment. This writer followed up with Michael regarding [facility] since his mother had left a VM but then didn't return this writers message. Michael expressed that "I'm not interested it's not for me". Encouraged him to continue to work on his recovery and not take on too much responsibility. Father contributed to conversation "I told him he needs to be careful about stress and things that trigger him". Reinforced his point. Michael also had an appointment with Dr Flores please refer to that note for a mental status. He will return to this clinic on 6/24 to meet with Dr. Flores and this writer for injection.

## 2025-05-29 NOTE — DISCUSSION/SUMMARY
[Date of Last Annual Labs: _____] : Date of Last Annual Labs: [unfilled] [Tobacco Screening Completed?] : Tobacco Screening Completed: Yes [Date of Last AIMS: _____] : Date of Last AIMS: [unfilled] [Date of Last HbgA1c: _____] : Date of Last HbgA1c: [unfilled] [Date of Last Lipid Profile: _____] : Date of Last Lipid Profile: [unfilled] [FreeTextEntry1] : Michael is a 22 yo male, never , no children, domiciled with family (mother, father, 25yo brother, 20yo sister), in private residence, 3rd year of [employer] studying Accounting with a focus on  (also taking online courses at [employer]), previously self-employed part-time design, 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 PMH, PPH of schizoaffective bipolar type on invega sustenna HORN, multiple IPP admissions (first IPP admission 8/3/20-9/16/20 or new onset psychosis, second IPP in 2/17/21-3/25/21 for non-compliance with medications, third IPP at [facility] 03/28/24-04/04/24 for worsening psychosis and aggression , 4th IPP 4/3/24-4/18/24 for worsening psychosis/diana at [place], [place]), hx of medication trials on lithium and klonopin, hx of 8 bilateral ECT treatments with significant improvement in psychotic and mood symptoms, no pSA/NSSIB, no SI/HI, no substance use, no reported legal hx, who was previously a pt of Dr. Garcia through FEP (first episode psychosis) program, now a patient at [facility]  Per chart review from [facility], "Dario presented initially with changes in behavior since July 2020 leading to disorganized thought process/behavior and internal preoccupation with physically aggressive behavior resulting in first hospitalization at [facility] 8/3/20-9/16/20 consistent with diagnosis of Schizophrenia. He was on Risperdal and Klonopin, but stopped taking meds around late 2021 and early 2021 which led to his second hospitalization 2/17/21-3/25/21 for disorganized behavior, AH, and paranoia in the context of medication non-adherence. As per discharge summary, patient during hospitalization had several episodes of agitation, aggression due to his psychosis. Pt was started on Zyprexa, with plan to transition to HORN, and dose was titrated up to 20mg. Dario developed elevated LFTs and neutropenia, therefore was cross titrated to Invega and lithium + klonopin were also started for agitation and mood. Pt showed minimal improvement. ECT was then considered, and Lithium and Klonopin were tapered off. Pt had 8 Bilateral ECT treatments with significant improvement in psychotic and mood symptoms. He had no significant side effects from the injection. Michael was again hospitalized in September at [facility] for ECT, lithium and Klonopin were tapered off, and he received 8 ECT treatments. Micheal again began receiving ECT series treatment in March for worsening paranoia, impulsivity, and worsening AH.   At follow up today, patient to well and at baseline, he does not appear internally preoccupied, nor longer giggling inappropriately, he denies any paranoia. Currently on monthly maintenance ECT.  Pt denies any akathisia, EPS, command AH to hurt himself or others, or paranoia. Denies any AVH.  Impression: schizoaffective disorder, bipolar type, with poor prognostic features  Plan:  - Pt to continue monthly maintenance ECT, care coordinated with Dr. Deepak Diop last on 4/28 - Continue Haldol Dec 150 mg q4wks, last given 5/29, next to be administered 6/24 - Continue Cogentin 1 mg qhs for akathisia - Lipid panel from January 2025 revealed hyperlipidemia, discussed with patient and father - Discussed starting low dose lithium now during maintenance ECT for schizoaffective disorder, however parents decline - Pt sp Invega HORN 234 mg, lithium (1350) and Klonopin, may consider restarting if psychotic symptoms worsen - in future, may consider initiation of clozapine, however pt previously neutropenic on zyprexa and had elevated liver enzymes - Pt referred to [facility] program

## 2025-05-29 NOTE — HISTORY OF PRESENT ILLNESS
[FreeTextEntry2] : Hospitalization at [facility] 8/3/20 to 9/16/20. ER records describe patient as "disorganized with constricted affect, behavior changes at home, increased aggressivity and internally preoccupied." He was stabilized on Risperdal and Klonopin. Pt then stopped taking meds, and was admitted 2/17/21-3/25/21 for decompensation. Pt was started on Zyprexa, with plan to transition to HORN, and dose was titrated up to 20mg. Dario developed elevated LFTs and neutropenia, therefore was cross titrated to Invega and lithium + klonopin were also started for agitation and mood. Pt showed minimal improvement. ECT was then considered, and Lithium and Klonopin were tapered off. Pt had 8 Bilateral ECT treatments with significant improvement in psychotic and mood symptoms. He received Invega 234mg on 5/19/21, 156mg on 5/24/21. He had no significant side effects from the injection. His next dose of 156mg is today and then one month from today [FreeTextEntry1] : Chart reviewed, patient seen in person with pt's father.  Patient appears euthymic, not internally preoccupied, not giggling inappropriately. States that he is doing "good", continues to spend his time working out and playing video games. Denies any fears that people are out to hurt him and states that he is not hearing any voices. States he is sleeping and eating well. Denies any restlessness, stiffness, or other AE from the medications. States he is tolerating ECT well and will likely now be receiving ECT only on a monthly basis.  Spoke with father who states that patient has been doing well, notes that he has no acute concerns at this time.  Patient denies any SI/HI

## 2025-06-24 NOTE — DISCUSSION/SUMMARY
[Date of Last Annual Labs: _____] : Date of Last Annual Labs: [unfilled] [Tobacco Screening Completed?] : Tobacco Screening Completed: Yes [Date of Last AIMS: _____] : Date of Last AIMS: [unfilled] [Date of Last HbgA1c: _____] : Date of Last HbgA1c: [unfilled] [Date of Last Lipid Profile: _____] : Date of Last Lipid Profile: [unfilled] [FreeTextEntry1] : Michael is a 20 yo male, never , no children, domiciled with family (mother, father, 25yo brother, 22yo sister), in private residence, 3rd year of [employer] studying Accounting with a focus on  (also taking online courses at [employer]), previously self-employed part-time design, 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 PMH, PPH of schizoaffective bipolar type on invega sustenna HORN, multiple IPP admissions (first IPP admission 8/3/20-9/16/20 or new onset psychosis, second IPP in 2/17/21-3/25/21 for non-compliance with medications, third IPP at [facility] 03/28/24-04/04/24 for worsening psychosis and aggression , 4th IPP 4/3/24-4/18/24 for worsening psychosis/diana at [place], [place]), hx of medication trials on lithium and klonopin, hx of 8 bilateral ECT treatments with significant improvement in psychotic and mood symptoms, no pSA/NSSIB, no SI/HI, no substance use, no reported legal hx, who was previously a pt of Dr. Garcia through [facility] (first episode psychosis) program, now a patient at [facility]  Per chart review from [facility], "Dario presented initially with changes in behavior since July 2020 leading to disorganized thought process/behavior and internal preoccupation with physically aggressive behavior resulting in first hospitalization at [facility] 8/3/20-9/16/20 consistent with diagnosis of Schizophrenia. He was on Risperdal and Klonopin, but stopped taking meds around late 2021 and early 2021 which led to his second hospitalization 2/17/21-3/25/21 for disorganized behavior, AH, and paranoia in the context of medication non-adherence. As per discharge summary, patient during hospitalization had several episodes of agitation, aggression due to his psychosis. Pt was started on Zyprexa, with plan to transition to HORN, and dose was titrated up to 20mg. Dario developed elevated LFTs and neutropenia, therefore was cross titrated to Invega and lithium + klonopin were also started for agitation and mood. Pt showed minimal improvement. ECT was then considered, and Lithium and Klonopin were tapered off. Pt had 8 Bilateral ECT treatments with significant improvement in psychotic and mood symptoms. He had no significant side effects from the injection. Michael was again hospitalized in September at [facility] for ECT, lithium and Klonopin were tapered off, and he received 8 ECT treatments. Michael again began receiving ECT series treatment in March for worsening paranoia, impulsivity, and worsening AH.   At follow up today, patient to well and at baseline, he does not appear internally preoccupied, nor longer giggling inappropriately, he denies any paranoia. Currently on monthly maintenance ECT.  Pt denies any akathisia, EPS, command AH to hurt himself or others, or paranoia. Denies any AVH.  Impression: schizoaffective disorder, bipolar type, with poor prognostic features  Plan:  - Pt to continue monthly maintenance ECT, care coordinated with Dr. Deepak Diop last on 4/28 - Continue Haldol Dec 150 mg q4wks, last given  6/24, next to be administered 07/22 - Continue Cogentin 1 mg qhs for akathisia - Lipid panel from January 2025 revealed hyperlipidemia, discussed with patient and father - Discussed starting low dose lithium now during maintenance ECT for schizoaffective disorder, however parents decline - Pt sp Invega HORN 234 mg, lithium (1350) and Klonopin, may consider restarting if psychotic symptoms worsen - in future, may consider initiation of clozapine, however pt previously neutropenic on zyprexa and had elevated liver enzymes - Pt referred to [facility] - Follow up with next provider on July 22nd at 9am

## 2025-06-24 NOTE — TREATMENT
[No Side Effects] : No side effects [FreeTextEntry4] : Michael was seen today for Haldol Decanoate 150mg IM in the left deltoid muscle. Area cleansed prior to and without signs or symptoms of adverse reaction after administration. Medication education reviewed with Michael he denies any side effects or concerns at this time. Michael was calm and pleasant with appointment he also had an appointment with Dr Flores please refer to that note for a mental status. Michael will return to this clinic on 7/22 to meet with Dr Peñaloza and this writer for injection.

## 2025-06-24 NOTE — PHYSICAL EXAM
[None] : none [Well groomed] : well groomed [Cooperative] : cooperative [Euthymic] : euthymic [Full] : full [Clear] : clear [Circumstantial] : circumstantial [Tang[place]] : tangential [[place]] : concrete [None Reported] : none reported [Average] : average [WNL] : within normal limits [Mild] : mild [0] : 0 [No] : No [FreeTextEntry5] : continues to be somewhat oddly related but improved; no longer giggling [FreeTextEntry7] : concrete [FreeTextEntry1] : 1

## 2025-06-24 NOTE — HISTORY OF PRESENT ILLNESS
[FreeTextEntry1] : Chart reviewed, patient seen in person with pt's father.  Patient appears euthymic, not internally preoccupied, not giggling inappropriately. States that he is doing "good", continues to spend his time working out and playing video games. States that he is following a 2,000 calorie daily diet these days.  Denies any fears that people are out to hurt him and states that he is not hearing any voices. States he is sleeping and eating well; though he occasionally sleeps in the afternoon. Sleep hygiene advice provided. Denies any restlessness, stiffness, or other AE from the medications. States he is tolerating ECT well and is only receiving ECT on a monthly basis at this time.  Spoke with father who states that patient has been doing well, notes that he has no acute concerns at this time.  Patient denies any SI/HI   [FreeTextEntry2] : Hospitalization at [facility] 8/3/20 to 9/16/20. ER records describe patient as "disorganized with constricted affect, behavior changes at home, increased aggressivity and internally preoccupied." He was stabilized on Risperdal and Klonopin. Pt then stopped taking meds, and was admitted 2/17/21-3/25/21 for decompensation. Pt was started on Zyprexa, with plan to transition to HORN, and dose was titrated up to 20mg. Dario developed elevated LFTs and neutropenia, therefore was cross titrated to Invega and lithium + klonopin were also started for agitation and mood. Pt showed minimal improvement. ECT was then considered, and Lithium and Klonopin were tapered off. Pt had 8 Bilateral ECT treatments with significant improvement in psychotic and mood symptoms. He received Invega 234mg on 5/19/21, 156mg on 5/24/21. He had no significant side effects from the injection. His next dose of 156mg is today and then one month from today

## 2025-07-22 NOTE — DISCUSSION/SUMMARY
[Date of Last Annual Labs: _____] : Date of Last Annual Labs: [unfilled] [Tobacco Screening Completed?] : Tobacco Screening Completed: Yes [Date of Last AIMS: _____] : Date of Last AIMS: [unfilled] [Date of Last HbgA1c: _____] : Date of Last HbgA1c: [unfilled] [Date of Last Lipid Profile: _____] : Date of Last Lipid Profile: [unfilled] [FreeTextEntry1] : Michael is a 21 yo male, never , no children, domiciled with family (mother, father, 25yo brother, 20yo sister), in private residence, was at [employer] for 2 years, started school at [employer], plans to go back for nursing degree, previously self-employed part-time design, 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 PMH, PPH of schizoaffective bipolar type on invega sustenna HORN, multiple IPP admissions (first IPP admission 8/3/20-9/16/20 or new onset psychosis, second IPP in 2/17/21-3/25/21 for non-compliance with medications, third IPP at [facility] 03/28/24-04/04/24 for worsening psychosis and aggression , 4th IPP 4/3/24-4/18/24 for worsening psychosis/diana at [place], [place]), hx of medication trials on lithium and klonopin, hx of 8 bilateral ECT treatments with significant improvement in psychotic and mood symptoms, no pSA/NSSIB, no SI/HI, no substance use, no reported legal hx, who was previously a pt of Dr. Garcia through FEP (first episode psychosis) program, now a patient at [facility].  Per chart review from [facility], "Dario presented initially with changes in behavior since July 2020 leading to disorganized thought process/behavior and internal preoccupation with physically aggressive behavior resulting in first hospitalization at [facility] 8/3/20-9/16/20 consistent with diagnosis of Schizophrenia. He was on Risperdal and Klonopin, but stopped taking meds around late 2021 and early 2021 which led to his second hospitalization 2/17/21-3/25/21 for disorganized behavior, AH, and paranoia in the context of medication non-adherence. As per discharge summary, patient during hospitalization had several episodes of agitation, aggression due to his psychosis. Pt was started on Zyprexa, with plan to transition to HORN, and dose was titrated up to 20mg. Dario developed elevated LFTs and neutropenia, therefore was cross titrated to Invega and lithium + klonopin were also started for agitation and mood. Pt showed minimal improvement. ECT was then considered, and Lithium and Klonopin were tapered off. Pt had 8 Bilateral ECT treatments with significant improvement in psychotic and mood symptoms. He had no significant side effects from the injection. Michael was again hospitalized in September at [facility] for ECT, lithium and Klonopin were tapered off, and he received 8 ECT treatments. Michael again began receiving ECT series treatment in March for worsening paranoia, impulsivity, and worsening AH.   At follow up today, patient to well and at baseline, he does not appear internally preoccupied, denying SI/HI/AVH/substance use. Father also denies any safety issues concerning patient. Currently on monthly maintenance ECT.  Pt denies any akathisia.  Impression: schizoaffective disorder, bipolar type, with poor prognostic features  Plan: - Pt to continue monthly maintenance ECT at [facility]  - Continue Haldol Dec 150 mg q4wks, last given 7/22, next due 8/19  #Previous medication / interventions - Continue Cogentin 1 mg qhs for akathisia - Lipid panel from January 2025 revealed hyperlipidemia, discussed with patient and father - Discussed starting low dose lithium now during maintenance ECT for schizoaffective disorder, however parents decline - Pt sp Invega HORN 234 mg, lithium (1350) and Klonopin, may consider restarting if psychotic symptoms worsen - in future, may consider initiation of clozapine, however pt previously neutropenic on zyprexa and had elevated liver enzymes - Pt referred to [facility] - didn't like the people there, went only once.

## 2025-07-22 NOTE — PHYSICAL EXAM
[None] : none [Well groomed] : well groomed [Cooperative] : cooperative [Euthymic] : euthymic [Full] : full [Clear] : clear [None Reported] : none reported [Average] : average [0] : 0 [No] : No [Linear/Goal Directed] : linear/goal directed [WNL] : within normal limits [FreeTextEntry7] : concrete [FreeTextEntry1] : 1

## 2025-07-22 NOTE — TREATMENT
[No Side Effects] : No side effects [FreeTextEntry4] : Pt was seen today for Haloperidol Decanoate 150mg IM to the right deltoid muscle, Pt was assessed, pt is axo4, pt ambulates well without assistance. Pt states his mood is "good today." Pt denies any concern for administered medication, area was cleansed prior to admin and is without signs or symptoms of adverse effects. Pt is scheduled for next dose on 8/19/25

## 2025-07-22 NOTE — HISTORY OF PRESENT ILLNESS
[FreeTextEntry1] : Patient presented to the appointment with his father. Patient and father both note doing well. Patient states he goes to the gym, eats well, and plans to go back to school for a nursing degree. Patient notes his last AVH as 2 years ago. Mood is "good." Patient denies being in a relationship at this time. He states he has friends he hangs out with and go to the board walk with. Father has no safety concerns. No SI/HI/  [FreeTextEntry2] : Hospitalization at [facility] 8/3/20 to 9/16/20. ER records describe patient as "disorganized with constricted affect, behavior changes at home, increased aggressivity and internally preoccupied." He was stabilized on Risperdal and Klonopin. Pt then stopped taking meds, and was admitted 2/17/21-3/25/21 for decompensation. Pt was started on Zyprexa, with plan to transition to [facility], and dose was titrated up to 20mg. Dario developed elevated LFTs and neutropenia, therefore was cross titrated to Invega and lithium + klonopin were also started for agitation and mood. Pt showed minimal improvement. ECT was then considered, and Lithium and Klonopin were tapered off. Pt had 8 Bilateral ECT treatments with significant improvement in psychotic and mood symptoms. He received Invega 234mg on 5/19/21, 156mg on 5/24/21. He had no significant side effects from the injection. His next dose of 156mg is today and then one month from today

## 2025-07-30 NOTE — PLAN
[FreeTextEntry2] : Goal 1. The patient will recognize and improve daily symptoms of his schizoaffective disorder, bipolar type diagnosis as evidenced by an improvement in scores on mental health scales reviewed in therapy sessions every 1 year  Obj 1- The patient will attend bi weekly therapy sessions and will review symptoms and coping skills during sessions. The patient will practice 1 meaningful activity a day   Obj 2- The patient will take daily meds as prescribed and meet with psychiatrist as scheduled  [Cognitive and/or Behavior Therapy] : Cognitive and/or Behavior Therapy  [Interpersonal Therapy] : Interpersonal Therapy  [Motivational Interviewing] : Motivational Interviewing  [Psychodynamic Therapy] : Psychodynamic Therapy  [Psychoeducation] : Psychoeducation  [Skills training (all types)] : Skills training (all types)  [Supportive Therapy] : Supportive Therapy [de-identified] : The therapist met the patient in person for the session. The patient came with his father; the patient was ok with the father to be present during the session. The patient reported that his mood is stable, no auditory/visual hallucinations, no depression and anxiety. However, the patient stated that he was sad last week, was not sure of the trigger but stated that it was a short period of sadness and then it was gone. Additionally, the patient continues to get ECT maintenance 1/month and sometimes "my brain hurts as well as heart after the ECT." The patient's father clarified that he spoke to "ECT Doctors", and they stated that the patient might have some headaches after the procedure. Overall, the patient is at his baseline and continues to giggle during his sessions at times. The patient's plan is to find a job and, in the meantime, sends out resumes on [employer]. Furthermore, the patient had no complains or new issues or concerns. The patient takes medications as prescribed. Throughout the session, the patient was provided with active listening, motivational interviewing, emotional support, and empathy  [Recommended Frequency of Visits: ____] : Recommended frequency of visits: [unfilled] [Return in ____ week(s)] : Return in [unfilled] week(s) [FreeTextEntry1] : the therapist will meet the patient every 2-3 weeks